# Patient Record
Sex: FEMALE | Race: BLACK OR AFRICAN AMERICAN | NOT HISPANIC OR LATINO | URBAN - METROPOLITAN AREA
[De-identification: names, ages, dates, MRNs, and addresses within clinical notes are randomized per-mention and may not be internally consistent; named-entity substitution may affect disease eponyms.]

---

## 2017-06-20 ENCOUNTER — OUTPATIENT (OUTPATIENT)
Dept: OUTPATIENT SERVICES | Facility: HOSPITAL | Age: 62
LOS: 1 days | End: 2017-06-20
Payer: COMMERCIAL

## 2017-06-20 DIAGNOSIS — Z98.51 TUBAL LIGATION STATUS: Chronic | ICD-10-CM

## 2017-06-21 PROCEDURE — A9516: CPT

## 2017-06-21 PROCEDURE — 78014 THYROID IMAGING W/BLOOD FLOW: CPT

## 2017-06-21 PROCEDURE — 78014 THYROID IMAGING W/BLOOD FLOW: CPT | Mod: 26

## 2018-04-04 ENCOUNTER — APPOINTMENT (OUTPATIENT)
Dept: ULTRASOUND IMAGING | Facility: CLINIC | Age: 63
End: 2018-04-04
Payer: COMMERCIAL

## 2018-04-04 ENCOUNTER — APPOINTMENT (OUTPATIENT)
Dept: MAMMOGRAPHY | Facility: CLINIC | Age: 63
End: 2018-04-04
Payer: COMMERCIAL

## 2018-04-04 ENCOUNTER — OUTPATIENT (OUTPATIENT)
Dept: OUTPATIENT SERVICES | Facility: HOSPITAL | Age: 63
LOS: 1 days | End: 2018-04-04

## 2018-04-04 DIAGNOSIS — Z98.51 TUBAL LIGATION STATUS: Chronic | ICD-10-CM

## 2018-04-04 PROCEDURE — G0279: CPT | Mod: 26

## 2018-04-04 PROCEDURE — 77066 DX MAMMO INCL CAD BI: CPT | Mod: 26

## 2018-04-04 PROCEDURE — 76641 ULTRASOUND BREAST COMPLETE: CPT | Mod: 26,50

## 2018-05-11 ENCOUNTER — APPOINTMENT (OUTPATIENT)
Dept: OBGYN | Facility: CLINIC | Age: 63
End: 2018-05-11
Payer: SELF-PAY

## 2018-05-11 VITALS
DIASTOLIC BLOOD PRESSURE: 78 MMHG | HEART RATE: 80 BPM | BODY MASS INDEX: 26.68 KG/M2 | HEIGHT: 62 IN | WEIGHT: 145 LBS | SYSTOLIC BLOOD PRESSURE: 122 MMHG

## 2018-05-11 PROCEDURE — 99386 PREV VISIT NEW AGE 40-64: CPT

## 2018-05-11 RX ORDER — ATENOLOL 25 MG/1
25 TABLET ORAL
Qty: 90 | Refills: 0 | Status: ACTIVE | COMMUNITY
Start: 2017-06-13

## 2018-05-11 RX ORDER — PEN NEEDLE, DIABETIC 29 G X1/2"
32G X 4 MM NEEDLE, DISPOSABLE MISCELLANEOUS
Qty: 360 | Refills: 0 | Status: ACTIVE | COMMUNITY
Start: 2018-03-28

## 2018-05-11 RX ORDER — OMEPRAZOLE 40 MG/1
40 CAPSULE, DELAYED RELEASE ORAL
Refills: 0 | Status: ACTIVE | COMMUNITY

## 2018-05-14 LAB — HPV HIGH+LOW RISK DNA PNL CVX: NOT DETECTED

## 2018-05-17 LAB — CYTOLOGY CVX/VAG DOC THIN PREP: NORMAL

## 2019-02-27 ENCOUNTER — TRANSCRIPTION ENCOUNTER (OUTPATIENT)
Age: 64
End: 2019-02-27

## 2019-02-27 ENCOUNTER — OUTPATIENT (OUTPATIENT)
Dept: EMERGENCY DEPT | Facility: HOSPITAL | Age: 64
LOS: 1 days | Discharge: HOME | End: 2019-02-27
Payer: MEDICAID

## 2019-02-27 VITALS
RESPIRATION RATE: 18 BRPM | DIASTOLIC BLOOD PRESSURE: 70 MMHG | TEMPERATURE: 98 F | SYSTOLIC BLOOD PRESSURE: 149 MMHG | OXYGEN SATURATION: 99 % | HEART RATE: 83 BPM

## 2019-02-27 VITALS
HEART RATE: 85 BPM | OXYGEN SATURATION: 100 % | TEMPERATURE: 98 F | DIASTOLIC BLOOD PRESSURE: 76 MMHG | RESPIRATION RATE: 16 BRPM | SYSTOLIC BLOOD PRESSURE: 127 MMHG

## 2019-02-27 DIAGNOSIS — Z98.51 TUBAL LIGATION STATUS: Chronic | ICD-10-CM

## 2019-02-27 DIAGNOSIS — Z98.890 OTHER SPECIFIED POSTPROCEDURAL STATES: Chronic | ICD-10-CM

## 2019-02-27 DIAGNOSIS — R07.9 CHEST PAIN, UNSPECIFIED: ICD-10-CM

## 2019-02-27 LAB
ALBUMIN SERPL ELPH-MCNC: 4.2 G/DL — SIGNIFICANT CHANGE UP (ref 3.3–5)
ALP SERPL-CCNC: 90 U/L — SIGNIFICANT CHANGE UP (ref 40–120)
ALT FLD-CCNC: 7 U/L — SIGNIFICANT CHANGE UP (ref 4–33)
ANION GAP SERPL CALC-SCNC: 16 MMO/L — HIGH (ref 7–14)
AST SERPL-CCNC: 10 U/L — SIGNIFICANT CHANGE UP (ref 4–32)
BASOPHILS # BLD AUTO: 0.05 K/UL — SIGNIFICANT CHANGE UP (ref 0–0.2)
BASOPHILS NFR BLD AUTO: 0.5 % — SIGNIFICANT CHANGE UP (ref 0–2)
BILIRUB SERPL-MCNC: 0.4 MG/DL — SIGNIFICANT CHANGE UP (ref 0.2–1.2)
BUN SERPL-MCNC: 25 MG/DL — HIGH (ref 7–23)
CALCIUM SERPL-MCNC: 9.9 MG/DL — SIGNIFICANT CHANGE UP (ref 8.4–10.5)
CHLORIDE SERPL-SCNC: 99 MMOL/L — SIGNIFICANT CHANGE UP (ref 98–107)
CO2 SERPL-SCNC: 23 MMOL/L — SIGNIFICANT CHANGE UP (ref 22–31)
CREAT SERPL-MCNC: 1.07 MG/DL — SIGNIFICANT CHANGE UP (ref 0.5–1.3)
EOSINOPHIL # BLD AUTO: 0.51 K/UL — HIGH (ref 0–0.5)
EOSINOPHIL NFR BLD AUTO: 5.1 % — SIGNIFICANT CHANGE UP (ref 0–6)
GLUCOSE SERPL-MCNC: 163 MG/DL — HIGH (ref 70–99)
HCT VFR BLD CALC: 33.4 % — LOW (ref 34.5–45)
HGB BLD-MCNC: 10 G/DL — LOW (ref 11.5–15.5)
IMM GRANULOCYTES NFR BLD AUTO: 0.3 % — SIGNIFICANT CHANGE UP (ref 0–1.5)
LYMPHOCYTES # BLD AUTO: 2.44 K/UL — SIGNIFICANT CHANGE UP (ref 1–3.3)
LYMPHOCYTES # BLD AUTO: 24.6 % — SIGNIFICANT CHANGE UP (ref 13–44)
MCHC RBC-ENTMCNC: 21.6 PG — LOW (ref 27–34)
MCHC RBC-ENTMCNC: 29.9 % — LOW (ref 32–36)
MCV RBC AUTO: 72.1 FL — LOW (ref 80–100)
MONOCYTES # BLD AUTO: 0.6 K/UL — SIGNIFICANT CHANGE UP (ref 0–0.9)
MONOCYTES NFR BLD AUTO: 6 % — SIGNIFICANT CHANGE UP (ref 2–14)
NEUTROPHILS # BLD AUTO: 6.3 K/UL — SIGNIFICANT CHANGE UP (ref 1.8–7.4)
NEUTROPHILS NFR BLD AUTO: 63.5 % — SIGNIFICANT CHANGE UP (ref 43–77)
NRBC # FLD: 0 K/UL — LOW (ref 25–125)
PLATELET # BLD AUTO: 463 K/UL — HIGH (ref 150–400)
PMV BLD: 10.8 FL — SIGNIFICANT CHANGE UP (ref 7–13)
POTASSIUM SERPL-MCNC: 4.2 MMOL/L — SIGNIFICANT CHANGE UP (ref 3.5–5.3)
POTASSIUM SERPL-SCNC: 4.2 MMOL/L — SIGNIFICANT CHANGE UP (ref 3.5–5.3)
PROT SERPL-MCNC: 7.3 G/DL — SIGNIFICANT CHANGE UP (ref 6–8.3)
RBC # BLD: 4.63 M/UL — SIGNIFICANT CHANGE UP (ref 3.8–5.2)
RBC # FLD: 17.7 % — HIGH (ref 10.3–14.5)
SODIUM SERPL-SCNC: 138 MMOL/L — SIGNIFICANT CHANGE UP (ref 135–145)
TROPONIN T, HIGH SENSITIVITY: < 6 NG/L — SIGNIFICANT CHANGE UP (ref ?–14)
TROPONIN T, HIGH SENSITIVITY: < 6 NG/L — SIGNIFICANT CHANGE UP (ref ?–14)
WBC # BLD: 9.93 K/UL — SIGNIFICANT CHANGE UP (ref 3.8–10.5)
WBC # FLD AUTO: 9.93 K/UL — SIGNIFICANT CHANGE UP (ref 3.8–10.5)

## 2019-02-27 PROCEDURE — 78452 HT MUSCLE IMAGE SPECT MULT: CPT | Mod: 26

## 2019-02-27 PROCEDURE — 93018 CV STRESS TEST I&R ONLY: CPT | Mod: GC

## 2019-02-27 PROCEDURE — 71046 X-RAY EXAM CHEST 2 VIEWS: CPT | Mod: 26

## 2019-02-27 PROCEDURE — 93016 CV STRESS TEST SUPVJ ONLY: CPT | Mod: GC

## 2019-02-27 RX ORDER — GLUCAGON INJECTION, SOLUTION 0.5 MG/.1ML
1 INJECTION, SOLUTION SUBCUTANEOUS ONCE
Qty: 0 | Refills: 0 | Status: DISCONTINUED | OUTPATIENT
Start: 2019-02-27 | End: 2019-02-27

## 2019-02-27 RX ORDER — DEXTROSE 50 % IN WATER 50 %
12.5 SYRINGE (ML) INTRAVENOUS ONCE
Qty: 0 | Refills: 0 | Status: DISCONTINUED | OUTPATIENT
Start: 2019-02-27 | End: 2019-02-27

## 2019-02-27 RX ORDER — INSULIN GLARGINE 100 [IU]/ML
16 INJECTION, SOLUTION SUBCUTANEOUS AT BEDTIME
Qty: 0 | Refills: 0 | Status: DISCONTINUED | OUTPATIENT
Start: 2019-02-27 | End: 2019-02-27

## 2019-02-27 RX ORDER — TRIAMTERENE/HYDROCHLOROTHIAZID 75 MG-50MG
1 TABLET ORAL DAILY
Qty: 0 | Refills: 0 | Status: DISCONTINUED | OUTPATIENT
Start: 2019-02-27 | End: 2019-02-27

## 2019-02-27 RX ORDER — ASPIRIN/CALCIUM CARB/MAGNESIUM 324 MG
81 TABLET ORAL DAILY
Qty: 0 | Refills: 0 | Status: DISCONTINUED | OUTPATIENT
Start: 2019-02-27 | End: 2019-02-27

## 2019-02-27 RX ORDER — INSULIN LISPRO 100/ML
VIAL (ML) SUBCUTANEOUS AT BEDTIME
Qty: 0 | Refills: 0 | Status: DISCONTINUED | OUTPATIENT
Start: 2019-02-27 | End: 2019-02-27

## 2019-02-27 RX ORDER — METFORMIN HYDROCHLORIDE 850 MG/1
500 TABLET ORAL DAILY
Qty: 0 | Refills: 0 | Status: DISCONTINUED | OUTPATIENT
Start: 2019-02-27 | End: 2019-02-27

## 2019-02-27 RX ORDER — SODIUM CHLORIDE 9 MG/ML
1000 INJECTION, SOLUTION INTRAVENOUS
Qty: 0 | Refills: 0 | Status: DISCONTINUED | OUTPATIENT
Start: 2019-02-27 | End: 2019-02-27

## 2019-02-27 RX ORDER — DEXTROSE 50 % IN WATER 50 %
25 SYRINGE (ML) INTRAVENOUS ONCE
Qty: 0 | Refills: 0 | Status: DISCONTINUED | OUTPATIENT
Start: 2019-02-27 | End: 2019-02-27

## 2019-02-27 RX ORDER — ATORVASTATIN CALCIUM 80 MG/1
20 TABLET, FILM COATED ORAL AT BEDTIME
Qty: 0 | Refills: 0 | Status: DISCONTINUED | OUTPATIENT
Start: 2019-02-27 | End: 2019-02-27

## 2019-02-27 RX ORDER — ASPIRIN/CALCIUM CARB/MAGNESIUM 324 MG
162 TABLET ORAL ONCE
Qty: 0 | Refills: 0 | Status: COMPLETED | OUTPATIENT
Start: 2019-02-27 | End: 2019-02-27

## 2019-02-27 RX ORDER — DEXTROSE 50 % IN WATER 50 %
15 SYRINGE (ML) INTRAVENOUS ONCE
Qty: 0 | Refills: 0 | Status: DISCONTINUED | OUTPATIENT
Start: 2019-02-27 | End: 2019-02-27

## 2019-02-27 RX ORDER — LOSARTAN POTASSIUM 100 MG/1
50 TABLET, FILM COATED ORAL DAILY
Qty: 0 | Refills: 0 | Status: DISCONTINUED | OUTPATIENT
Start: 2019-02-27 | End: 2019-02-27

## 2019-02-27 RX ORDER — INSULIN LISPRO 100/ML
VIAL (ML) SUBCUTANEOUS
Qty: 0 | Refills: 0 | Status: DISCONTINUED | OUTPATIENT
Start: 2019-02-27 | End: 2019-02-27

## 2019-02-27 RX ADMIN — Medication 162 MILLIGRAM(S): at 06:17

## 2019-02-27 RX ADMIN — Medication 1: at 13:08

## 2019-02-27 RX ADMIN — Medication 1 TABLET(S): at 13:09

## 2019-02-27 RX ADMIN — LOSARTAN POTASSIUM 50 MILLIGRAM(S): 100 TABLET, FILM COATED ORAL at 13:09

## 2019-02-27 NOTE — DISCHARGE NOTE ADULT - HOSPITAL COURSE
63 y.o. female presents today to ER c/o chest pain.  Negative Troponin.   Stress test  Myocardial Perfusion SPECT results are abnormal.  * There are medium sized, mild to moderate defects in  inferior and inferoseptal walls that are partially  reversible, suggestive of infarction with moderate  radha-infarct ischemia.  * Post-stress gated wall motion analysis was performed  (LVEF = 56 %;LVEDV = 56 ml.), revealing normal LV  function.  *** No previous Nuclear/Stress exam.    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   Stable 8 mm left upper lung nodule.  Otherwise, clear lungs with no significant change since 12/23/2013.    HgbA1c 9.2, recommended to follow up with endocrinologist in 1 week     As per Dr. Pinto, the patient can be discharged home today, instructed to follow up with her cardiologist, endocrinologist in 1 week

## 2019-02-27 NOTE — ED CDU PROVIDER INITIAL DAY NOTE - FAMILY HISTORY
No pertinent family history in first degree relatives Mother  Still living? No  Family history of MI (myocardial infarction), Age at diagnosis: Age Unknown

## 2019-02-27 NOTE — ED CDU PROVIDER DISPOSITION NOTE - NSFOLLOWUPINSTRUCTIONS_ED_ALL_ED_FT
Follow up with your PMD within 48-72 hours. Recommend follow up with a Cardiologist- referral list provided within the week. Show copies of your reports given to you. Recommend Aspirin 81mg over the counter daily until further evaluation.  Take all of your other medications as previously prescribed. Worsening, continued or new concerning symptoms return to the Emergency Department.

## 2019-02-27 NOTE — H&P CARDIOLOGY - HISTORY OF PRESENT ILLNESS
63 y.o. female presents today to ER c/o L sided chest started last night, 5/10, didn't radiate, continued for 1 hr. Denies diaphoresis, SOB, paresthesia, b/l lower edema. 63 y.o. female presents today to ER c/o L sided chest started last night, 5/10, didn't radiate, continued for 1 hr. Denies diaphoresis, SOB, paresthesia, SOB, palpitations, dizziness, presyncope, syncope, b/l lower extremities edema, abdominal pain, N/V/D/C, melena, hematochezia, h/o DVT, PE, DOMENIC, fever, chills, urinary symptoms, hematuria, recent travel, sick contacts. The patient was found to have negative Troponin. + stress test. The patient was brought to cath/recovery for cardiac cath.     Cardiac cath - mild non obstructive CAD, recommended to continue medications.

## 2019-02-27 NOTE — ED CDU PROVIDER INITIAL DAY NOTE - OBJECTIVE STATEMENT
63 year old female with a PMHx of HTN, DM pw constant, nonexertional left sided chest pain since last night. Pain was initially sharp in nature now dull, no associated symptoms. Last stress test ~ 1 year ago which was WNL. Denies n/v/f/c, cough, SOB, abdominal pain, dysuria, hematuria, melena, hematochezia, diarrhea, constipation, LE swelling/pain, immobilization, hx of clots/pe/dvt, palpitations.  In the ED: pt had TWI in leads v2-v3, sent to the CDU to r/o ACS: tele monitoring, trend troponin, stress test. 63 year old female with a PMHx of HTN, DM pw constant, nonexertional left sided chest pain since last night. Pain was initially sharp in nature now dull, no associated symptoms. Last stress test ~ 1 year ago which was WNL - unsure of her cardiologist. +family hx of mother with MI. Denies n/v/f/c, cough, SOB, abdominal pain, dysuria, hematuria, melena, hematochezia, diarrhea, constipation, LE swelling/pain, immobilization, hx of clots/pe/dvt, palpitations.  In the ED: pt had TWI in leads v2-v3, sent to the CDU to r/o ACS: tele monitoring, trend troponin, stress test. 63 year old female with a PMHx of HTN, DM pw constant, nonexertional left sided chest pain since last night after getting in an argument with her daughter. Pain was initially sharp in nature now dull, no associated symptoms. Last stress test ~ 1 year ago which was WNL - unsure of her cardiologist. +family hx of mother with MI. Denies n/v/f/c, cough, SOB, abdominal pain, dysuria, hematuria, melena, hematochezia, diarrhea, constipation, LE swelling/pain, immobilization, hx of clots/pe/dvt, palpitations.  In the ED: pt had TWI in leads v2-v3, sent to the CDU to r/o ACS: tele monitoring, trend troponin, stress test.

## 2019-02-27 NOTE — ED CDU PROVIDER INITIAL DAY NOTE - MEDICAL DECISION MAKING DETAILS
63 year old female with a PMHx of HTN, DM pw constant, nonexertional left sided chest pain since last night.   Plan: stress test, trend trop/ekg, tele monitor

## 2019-02-27 NOTE — DISCHARGE NOTE ADULT - PATIENT PORTAL LINK FT
You can access the [facility], offered by [facility], by registering with the following website: [email or web address]

## 2019-02-27 NOTE — ED CDU PROVIDER DISPOSITION NOTE - ATTENDING CONTRIBUTION TO CARE
I performed a face-to-face evaluation of the patient and performed a history and physical examination. I agree with the history and physical examination.    Sarthak: Middle-age F, describes CP precipitated by argument w/ daughter. Active w/ grandsons; doesn't get CP. Stress test positive for inf. and inf.-septal defects. Admit for cath.

## 2019-02-27 NOTE — DISCHARGE NOTE ADULT - SECONDARY DIAGNOSIS.
PUD (peptic ulcer disease) Hyperlipidemia, unspecified hyperlipidemia type Type 2 diabetes mellitus without complication, with long-term current use of insulin Hypertension, unspecified type Hyperthyroidism

## 2019-02-27 NOTE — H&P CARDIOLOGY - PMH
Hyperlipidemia    Hypertension    Hyperthyroidism    Other iron deficiency anemia    Personal history of diabetes mellitus    PUD (peptic ulcer disease)    Tobacco user

## 2019-02-27 NOTE — DISCHARGE NOTE ADULT - PLAN OF CARE
to maintain normal thyroid levels Please follow up with your endocrinologist regarding enlarged thyroid. Continue present medications. Avoid acidic or spicy food. Continue Omeprazole. Follow up with your gastroenterologist in 1-2 weeks. To maintain normal cholesterol levels to prevent heart attack, stroke Continue Rosuvastatin. Follow up with your PMD to routine blood check. Follow low cholesterol diet. To maintain normal blood glucose levels hole Metformin for 3 days, restart on 3/2/19, You HGbA1c is 9.0. Please follow up with your endocrinologist to review your medications. Follow low carbohydrate and low fat diet. to avoid symptoms Please follow up with your cardiologist in 1 week To maintain normal blood pressure and avoid complications such as heart attack, stroke, renal failure COntinue your blood pressure medication. Follow low sodium and low cholesterol diet. Follow up with your PMD/cardiologist in 1 week.

## 2019-02-27 NOTE — ED CDU PROVIDER DISPOSITION NOTE - CLINICAL COURSE
Sarthak: Middle-age F, describes CP precipitated by argument w/ daughter. Active w/ grandsons; doesn't get CP. Stress test positive for inf. and inf.-septal defects. Admit for cath.

## 2019-02-27 NOTE — ED ADULT NURSE NOTE - OBJECTIVE STATEMENT
64 yo AAO4, ambulatory, brought to intake room 5 c/o CP. pt describes it as midsternal, "a little achy,' nonradiating. NSR on monitor. 22g placed in left hand, labs drawn and sent, will await further orders

## 2019-02-27 NOTE — CHART NOTE - NSCHARTNOTEFT_GEN_A_CORE
The patient was noted to have elevated HgbA1c 9.2, recommended endocrinology consult, however she declined and claims that will f/u with her endocrinologist in 3/2019. The patient claims that her HgbA1c was 10 in 12/2018 and she is compliant with her medications.

## 2019-02-27 NOTE — ED CDU PROVIDER INITIAL DAY NOTE - ATTENDING CONTRIBUTION TO CARE
I performed a face-to-face evaluation of the patient and performed a history and physical examination. I agree with the history and physical examination.    Sarthak: Middle-age F, describes CP precipitated by argument w/ daughter. Active w/ grandsons; doesn't get CP. Initial trop. and EKG unremarkable. Admit to CDU for stress test.

## 2019-02-27 NOTE — ED ADULT TRIAGE NOTE - CHIEF COMPLAINT QUOTE
Patient from home c/o L sided chest pain s/p argument with daughter. Patient denies N/V, sob, palpitations. Hx. Acid reflux, DM, hypothyroidism, HTN.

## 2019-02-27 NOTE — ED PROVIDER NOTE - OBJECTIVE STATEMENT
62 yo F with HTN, DM here with left sided chest pain that is pressure-like and began after the pt had a verbal argument with her daughter. The pt denies f/c, cough, sob, palpitations, diaphoresis, n/v, abdominal pain, paresthesias, weakness. The pt had a negative stress test one year ago.

## 2019-02-27 NOTE — ED CDU PROVIDER INITIAL DAY NOTE - PROGRESS NOTE DETAILS
received sign out from MADISON Hodges. pt seen and examined by Dr. De León and I. pt states she was arguing with her daughter last night and developed chest tightness. pt stressed over their argument, crying right now. admits to feeling sad, no SI. states feeling better today with no cp, sob, le edema, diaphoresis, n/v. exam: well appearing. mucosa moist. heart rrr. lungs clear b/l. no le edema, no calf tenderness. I called her outpt cardiologist Dr. Coyne at [phone]. can follow up today or tomorrow. spoke with kelsea from stress lab, given cancelations can take pt for stress today now. will continue to monitor. received call from stress lab cardiologist, stress test with mild ischemia to interior to septal wall. called dr. ely cardiologist outpt who is unavailable. cards paged to see pt pt seen by cards, accepted to Dr. Pinto for cath received call from stress lab cardiologist, stress test with mild ischemia to interior to septal wall. called dr. ely cardiologist outpt, aware of plan. not affiliated here. cards paged to see pt

## 2019-02-27 NOTE — DISCHARGE NOTE ADULT - MEDICATION SUMMARY - MEDICATIONS TO TAKE
I will START or STAY ON the medications listed below when I get home from the hospital:    Aspirin Enteric Coated 81 mg oral delayed release tablet  -- 1 tab(s) by mouth once a day  -- Indication: For Prophylaxis    losartan 50 mg oral tablet  -- 1 tab(s) by mouth once a day  -- Indication: For HTN    calcium carbonate  -- Indication: For Prophylaxis    Lantus 100 units/mL subcutaneous solution  -- 20 unit(s) subcutaneous once a day (at bedtime)  -- Indication: For DM2    Januvia 100 mg oral tablet  -- 1 tab(s) by mouth once a day  -- Indication: For DM 2    metFORMIN 1000 mg oral tablet  -- 1 tab(s) by mouth 2 times a day  HOLD METFORMIN FRO 3 DAYS, RESTART ON 3/2/19  -- Indication: For DM 2    metFORMIN 500 mg oral tablet, extended release  -- 1 tab(s) by mouth once a day  HOLD METFROMIN FOR 3 DAYS, RESTART ON 3/2/19  -- Indication: For DM 2    rosuvastatin 5 mg oral tablet  -- 1 tab(s) by mouth once a day (at bedtime)  -- Indication: For Hypercholesterolemia    triamterene-hydrochlorothiazide 37.5 mg-25 mg oral tablet  -- 1 tab(s) by mouth once a day  -- Indication: For HTN    methIMAzole 5 mg oral tablet  -- 1 tab(s) by mouth once a day  -- Indication: For Hyperthyroidism    atenolol 25 mg oral tablet  -- 1 tab(s) by mouth once a day  -- Indication: For HTN    Turmeric 500 mg oral capsule  -- 1 cap(s) by mouth once a day  -- Indication: For supplement    ferrous sulfate 325 mg (65 mg elemental iron) oral tablet  -- 2 tab(s) by mouth once a day  -- Indication: For Anemia    brimonidine 0.1% ophthalmic solution  -- 1 drop(s) to R eye 2 times a day  -- Indication: For Glaucoma    timolol hemihydrate 0.5% ophthalmic solution  -- 1 drop(s) to R eye 2 times a day  -- Indication: For Glaucoma    latanoprost 0.005% ophthalmic solution  -- 1 drop(s) to R eye once a day (in the evening)  -- Indication: For Glaucoma    omeprazole 40 mg oral delayed release capsule  -- 1 cap(s) by mouth once a day  -- Indication: For PUD (peptic ulcer disease)    Vitamin B12  -- Indication: For VIt B12 deficiency

## 2019-02-27 NOTE — DISCHARGE NOTE ADULT - CARE PROVIDER_API CALL
Uri Barber)  Internal Medicine  [address] [place] [address]  Phone: [phone]  Fax: [phone]  Follow Up Time:

## 2019-02-27 NOTE — DISCHARGE NOTE ADULT - CARE PLAN
Principal Discharge DX:	Atypical chest pain  Goal:	to avoid symptoms  Assessment and plan of treatment:	Please follow up with your cardiologist in 1 week  Secondary Diagnosis:	Hypertension, unspecified type  Goal:	To maintain normal blood pressure and avoid complications such as heart attack, stroke, renal failure  Assessment and plan of treatment:	COntinue your blood pressure medication. Follow low sodium and low cholesterol diet. Follow up with your PMD/cardiologist in 1 week.  Secondary Diagnosis:	Hyperthyroidism  Goal:	to maintain normal thyroid levels  Assessment and plan of treatment:	Please follow up with your endocrinologist regarding enlarged thyroid. Continue present medications.  Secondary Diagnosis:	PUD (peptic ulcer disease)  Goal:	Avoid acidic or spicy food. Continue Omeprazole. Follow up with your gastroenterologist in 1-2 weeks.  Secondary Diagnosis:	Hyperlipidemia, unspecified hyperlipidemia type  Goal:	To maintain normal cholesterol levels to prevent heart attack, stroke  Assessment and plan of treatment:	Continue Rosuvastatin. Follow up with your PMD to routine blood check. Follow low cholesterol diet.  Secondary Diagnosis:	Type 2 diabetes mellitus without complication, with long-term current use of insulin  Goal:	To maintain normal blood glucose levels  Assessment and plan of treatment:	hole Metformin for 3 days, restart on 3/2/19, You HGbA1c is 9.0. Please follow up with your endocrinologist to review your medications. Follow low carbohydrate and low fat diet.

## 2019-03-01 ENCOUNTER — OUTPATIENT (OUTPATIENT)
Dept: OUTPATIENT SERVICES | Facility: HOSPITAL | Age: 64
LOS: 1 days | End: 2019-03-01
Payer: MEDICARE

## 2019-03-01 DIAGNOSIS — Z98.890 OTHER SPECIFIED POSTPROCEDURAL STATES: Chronic | ICD-10-CM

## 2019-03-01 DIAGNOSIS — Z98.51 TUBAL LIGATION STATUS: Chronic | ICD-10-CM

## 2019-03-01 PROCEDURE — G9001: CPT

## 2019-03-04 RX ORDER — METHIMAZOLE 10 MG/1
1 TABLET ORAL
Qty: 0 | Refills: 0 | COMMUNITY

## 2019-03-04 RX ORDER — CALCIUM CARBONATE 500(1250)
0 TABLET ORAL
Qty: 0 | Refills: 0 | COMMUNITY

## 2019-03-04 RX ORDER — LATANOPROST 0.05 MG/ML
1 SOLUTION/ DROPS OPHTHALMIC; TOPICAL
Qty: 0 | Refills: 0 | COMMUNITY

## 2019-03-04 RX ORDER — TRIAMTERENE/HYDROCHLOROTHIAZID 75 MG-50MG
1 TABLET ORAL
Qty: 0 | Refills: 0 | COMMUNITY

## 2019-03-04 RX ORDER — TIMOLOL 0.5 %
1 DROPS OPHTHALMIC (EYE)
Qty: 0 | Refills: 0 | COMMUNITY

## 2019-03-04 RX ORDER — MILK THISTLE 150 MG
1 CAPSULE ORAL
Qty: 0 | Refills: 0 | COMMUNITY

## 2019-03-04 RX ORDER — METFORMIN HYDROCHLORIDE 850 MG/1
1 TABLET ORAL
Qty: 0 | Refills: 0 | COMMUNITY

## 2019-03-04 RX ORDER — ASPIRIN/CALCIUM CARB/MAGNESIUM 324 MG
1 TABLET ORAL
Qty: 0 | Refills: 0 | COMMUNITY

## 2019-03-04 RX ORDER — ATENOLOL 25 MG/1
1 TABLET ORAL
Qty: 0 | Refills: 0 | COMMUNITY

## 2019-03-04 RX ORDER — ROSUVASTATIN CALCIUM 5 MG/1
1 TABLET ORAL
Qty: 0 | Refills: 0 | COMMUNITY

## 2019-03-04 RX ORDER — PREGABALIN 225 MG/1
0 CAPSULE ORAL
Qty: 0 | Refills: 0 | COMMUNITY

## 2019-03-04 RX ORDER — FERROUS SULFATE 325(65) MG
2 TABLET ORAL
Qty: 0 | Refills: 0 | COMMUNITY

## 2019-03-04 RX ORDER — OMEPRAZOLE 10 MG/1
1 CAPSULE, DELAYED RELEASE ORAL
Qty: 0 | Refills: 0 | COMMUNITY

## 2019-03-04 RX ORDER — LOSARTAN POTASSIUM 100 MG/1
1 TABLET, FILM COATED ORAL
Qty: 0 | Refills: 0 | COMMUNITY

## 2019-03-04 RX ORDER — BRIMONIDINE TARTRATE 2 MG/MG
1 SOLUTION/ DROPS OPHTHALMIC
Qty: 0 | Refills: 0 | COMMUNITY

## 2019-03-04 RX ORDER — SITAGLIPTIN 50 MG/1
1 TABLET, FILM COATED ORAL
Qty: 0 | Refills: 0 | COMMUNITY

## 2019-03-04 RX ORDER — INSULIN GLARGINE 100 [IU]/ML
20 INJECTION, SOLUTION SUBCUTANEOUS
Qty: 0 | Refills: 0 | COMMUNITY

## 2019-03-08 DIAGNOSIS — Z71.89 OTHER SPECIFIED COUNSELING: ICD-10-CM

## 2019-03-08 PROBLEM — K27.9 PEPTIC ULCER, SITE UNSPECIFIED, UNSPECIFIED AS ACUTE OR CHRONIC, WITHOUT HEMORRHAGE OR PERFORATION: Chronic | Status: ACTIVE | Noted: 2019-02-27

## 2019-03-08 PROBLEM — D50.8 OTHER IRON DEFICIENCY ANEMIAS: Chronic | Status: ACTIVE | Noted: 2019-02-27

## 2019-03-08 PROBLEM — E05.90 THYROTOXICOSIS, UNSPECIFIED WITHOUT THYROTOXIC CRISIS OR STORM: Chronic | Status: ACTIVE | Noted: 2019-02-27

## 2019-04-09 ENCOUNTER — APPOINTMENT (OUTPATIENT)
Dept: OBGYN | Facility: CLINIC | Age: 64
End: 2019-04-09
Payer: MEDICARE

## 2019-04-09 VITALS
HEART RATE: 88 BPM | HEIGHT: 62 IN | WEIGHT: 144.19 LBS | SYSTOLIC BLOOD PRESSURE: 150 MMHG | BODY MASS INDEX: 26.53 KG/M2 | DIASTOLIC BLOOD PRESSURE: 85 MMHG

## 2019-04-09 DIAGNOSIS — R45.7 STATE OF EMOTIONAL SHOCK AND STRESS, UNSPECIFIED: ICD-10-CM

## 2019-04-09 PROCEDURE — 99213 OFFICE O/P EST LOW 20 MIN: CPT

## 2019-04-09 NOTE — CHIEF COMPLAINT
[Follow Up] : follow up GYN visit [FreeTextEntry1] : pt presents for follow up , s/p visit to ED pt has Type 2 Diabetes followed by Dr. Feliciano . while hospitalized on 2/27 pt had cardiac cath which showed non - obstructive CAD. pt states that something was mentioned to her during the ED visit about 'cervical cancer.......follow up with your gynecologist....' pt feels well. but is having family discord /emotional stress with daughter Lucila Husbands

## 2019-05-18 ENCOUNTER — EMERGENCY (EMERGENCY)
Facility: HOSPITAL | Age: 64
LOS: 1 days | Discharge: HOME | End: 2019-05-18
Admitting: EMERGENCY MEDICINE
Payer: MEDICARE

## 2019-05-18 VITALS
DIASTOLIC BLOOD PRESSURE: 79 MMHG | RESPIRATION RATE: 16 BRPM | TEMPERATURE: 98 F | OXYGEN SATURATION: 100 % | HEART RATE: 81 BPM | SYSTOLIC BLOOD PRESSURE: 154 MMHG

## 2019-05-18 DIAGNOSIS — Z98.890 OTHER SPECIFIED POSTPROCEDURAL STATES: Chronic | ICD-10-CM

## 2019-05-18 DIAGNOSIS — Z98.51 TUBAL LIGATION STATUS: Chronic | ICD-10-CM

## 2019-05-18 PROCEDURE — 99283 EMERGENCY DEPT VISIT LOW MDM: CPT

## 2019-05-18 NOTE — ED ADULT TRIAGE NOTE - CHIEF COMPLAINT QUOTE
Pt [place] [employer] with [employer] for safety, reports Suicidal thoughts after having Verbal Argument with her daughter. report she says that to make her daughter fell upst. Dneies Psych Hx. Denies HI , Hearing /visual Hallucination

## 2019-05-18 NOTE — ED PROVIDER NOTE - CLINICAL SUMMARY MEDICAL DECISION MAKING FREE TEXT BOX
65 y/o  M hx  HTN, HLD, DM, Hypothyroid   Medical evaluation performed. There is no clinical evidence of intoxication or any acute medical problem requiring immediate intervention.  Recommend following up with [facility]. D/C home via cab

## 2019-05-18 NOTE — ED PROVIDER NOTE - NSFOLLOWUPCLINICS_GEN_ALL_ED_FT
[facility]  Behavioral Health  [address] [place] [address]  Phone: [phone]  Fax:   Follow Up Time:

## 2019-05-20 ENCOUNTER — EMERGENCY (EMERGENCY)
Facility: HOSPITAL | Age: 64
LOS: 1 days | Discharge: HOME | End: 2019-05-20
Attending: EMERGENCY MEDICINE | Admitting: EMERGENCY MEDICINE
Payer: MEDICARE

## 2019-05-20 VITALS
SYSTOLIC BLOOD PRESSURE: 140 MMHG | OXYGEN SATURATION: 98 % | HEART RATE: 85 BPM | DIASTOLIC BLOOD PRESSURE: 62 MMHG | RESPIRATION RATE: 18 BRPM | TEMPERATURE: 98 F

## 2019-05-20 DIAGNOSIS — R69 ILLNESS, UNSPECIFIED: ICD-10-CM

## 2019-05-20 DIAGNOSIS — F43.21 ADJUSTMENT DISORDER WITH DEPRESSED MOOD: ICD-10-CM

## 2019-05-20 DIAGNOSIS — Z98.51 TUBAL LIGATION STATUS: Chronic | ICD-10-CM

## 2019-05-20 DIAGNOSIS — Z98.890 OTHER SPECIFIED POSTPROCEDURAL STATES: Chronic | ICD-10-CM

## 2019-05-20 LAB
ALBUMIN SERPL ELPH-MCNC: 5.1 G/DL — HIGH (ref 3.3–5)
ALP SERPL-CCNC: 105 U/L — SIGNIFICANT CHANGE UP (ref 40–120)
ALT FLD-CCNC: 14 U/L — SIGNIFICANT CHANGE UP (ref 4–33)
ANION GAP SERPL CALC-SCNC: 17 MMO/L — HIGH (ref 7–14)
ANISOCYTOSIS BLD QL: SIGNIFICANT CHANGE UP
AST SERPL-CCNC: 17 U/L — SIGNIFICANT CHANGE UP (ref 4–32)
BASOPHILS # BLD AUTO: 0.06 K/UL — SIGNIFICANT CHANGE UP (ref 0–0.2)
BASOPHILS NFR BLD AUTO: 0.7 % — SIGNIFICANT CHANGE UP (ref 0–2)
BASOPHILS NFR SPEC: 0.9 % — SIGNIFICANT CHANGE UP (ref 0–2)
BILIRUB SERPL-MCNC: 0.7 MG/DL — SIGNIFICANT CHANGE UP (ref 0.2–1.2)
BLASTS # FLD: 0 % — SIGNIFICANT CHANGE UP (ref 0–0)
BUN SERPL-MCNC: 24 MG/DL — HIGH (ref 7–23)
CALCIUM SERPL-MCNC: 10.5 MG/DL — SIGNIFICANT CHANGE UP (ref 8.4–10.5)
CHLORIDE SERPL-SCNC: 96 MMOL/L — LOW (ref 98–107)
CO2 SERPL-SCNC: 25 MMOL/L — SIGNIFICANT CHANGE UP (ref 22–31)
CREAT SERPL-MCNC: 1.2 MG/DL — SIGNIFICANT CHANGE UP (ref 0.5–1.3)
EOSINOPHIL # BLD AUTO: 0.14 K/UL — SIGNIFICANT CHANGE UP (ref 0–0.5)
EOSINOPHIL NFR BLD AUTO: 1.6 % — SIGNIFICANT CHANGE UP (ref 0–6)
EOSINOPHIL NFR FLD: 4.3 % — SIGNIFICANT CHANGE UP (ref 0–6)
GIANT PLATELETS BLD QL SMEAR: PRESENT — SIGNIFICANT CHANGE UP
GLUCOSE SERPL-MCNC: 217 MG/DL — HIGH (ref 70–99)
HCT VFR BLD CALC: 33.4 % — LOW (ref 34.5–45)
HGB BLD-MCNC: 10.5 G/DL — LOW (ref 11.5–15.5)
HYPOCHROMIA BLD QL: SIGNIFICANT CHANGE UP
IMM GRANULOCYTES NFR BLD AUTO: 0.3 % — SIGNIFICANT CHANGE UP (ref 0–1.5)
LYMPHOCYTES # BLD AUTO: 2.82 K/UL — SIGNIFICANT CHANGE UP (ref 1–3.3)
LYMPHOCYTES # BLD AUTO: 31.4 % — SIGNIFICANT CHANGE UP (ref 13–44)
LYMPHOCYTES NFR SPEC AUTO: 32.2 % — SIGNIFICANT CHANGE UP (ref 13–44)
MCHC RBC-ENTMCNC: 21.7 PG — LOW (ref 27–34)
MCHC RBC-ENTMCNC: 31.4 % — LOW (ref 32–36)
MCV RBC AUTO: 69.2 FL — LOW (ref 80–100)
METAMYELOCYTES # FLD: 0 % — SIGNIFICANT CHANGE UP (ref 0–1)
MICROCYTES BLD QL: SIGNIFICANT CHANGE UP
MONOCYTES # BLD AUTO: 0.79 K/UL — SIGNIFICANT CHANGE UP (ref 0–0.9)
MONOCYTES NFR BLD AUTO: 8.8 % — SIGNIFICANT CHANGE UP (ref 2–14)
MONOCYTES NFR BLD: 7.8 % — SIGNIFICANT CHANGE UP (ref 2–9)
MYELOCYTES NFR BLD: 0 % — SIGNIFICANT CHANGE UP (ref 0–0)
NEUTROPHIL AB SER-ACNC: 53.9 % — SIGNIFICANT CHANGE UP (ref 43–77)
NEUTROPHILS # BLD AUTO: 5.14 K/UL — SIGNIFICANT CHANGE UP (ref 1.8–7.4)
NEUTROPHILS NFR BLD AUTO: 57.2 % — SIGNIFICANT CHANGE UP (ref 43–77)
NEUTS BAND # BLD: 0 % — SIGNIFICANT CHANGE UP (ref 0–6)
NRBC # FLD: 0 K/UL — SIGNIFICANT CHANGE UP (ref 0–0)
OTHER - HEMATOLOGY %: 0 — SIGNIFICANT CHANGE UP
PLATELET # BLD AUTO: 423 K/UL — HIGH (ref 150–400)
PLATELET COUNT - ESTIMATE: NORMAL — SIGNIFICANT CHANGE UP
PMV BLD: 10.2 FL — SIGNIFICANT CHANGE UP (ref 7–13)
POIKILOCYTOSIS BLD QL AUTO: SIGNIFICANT CHANGE UP
POLYCHROMASIA BLD QL SMEAR: SIGNIFICANT CHANGE UP
POTASSIUM SERPL-MCNC: 4.4 MMOL/L — SIGNIFICANT CHANGE UP (ref 3.5–5.3)
POTASSIUM SERPL-SCNC: 4.4 MMOL/L — SIGNIFICANT CHANGE UP (ref 3.5–5.3)
PROMYELOCYTES # FLD: 0 % — SIGNIFICANT CHANGE UP (ref 0–0)
PROT SERPL-MCNC: 8.2 G/DL — SIGNIFICANT CHANGE UP (ref 6–8.3)
RBC # BLD: 4.83 M/UL — SIGNIFICANT CHANGE UP (ref 3.8–5.2)
RBC # FLD: 17.6 % — HIGH (ref 10.3–14.5)
SCHISTOCYTES BLD QL AUTO: SLIGHT — SIGNIFICANT CHANGE UP
SODIUM SERPL-SCNC: 138 MMOL/L — SIGNIFICANT CHANGE UP (ref 135–145)
TSH SERPL-MCNC: 1.54 UIU/ML — SIGNIFICANT CHANGE UP (ref 0.27–4.2)
VARIANT LYMPHS # BLD: 0.9 % — SIGNIFICANT CHANGE UP
WBC # BLD: 8.98 K/UL — SIGNIFICANT CHANGE UP (ref 3.8–10.5)
WBC # FLD AUTO: 8.98 K/UL — SIGNIFICANT CHANGE UP (ref 3.8–10.5)

## 2019-05-20 PROCEDURE — 90792 PSYCH DIAG EVAL W/MED SRVCS: CPT | Mod: GC

## 2019-05-20 PROCEDURE — 99283 EMERGENCY DEPT VISIT LOW MDM: CPT

## 2019-05-20 NOTE — ED BEHAVIORAL HEALTH ASSESSMENT NOTE - CASE SUMMARY
64F with no formal psych history presents after making a suicidal threat in the context of argument with daughter. Patient does not meet full criteria for a depressive episode. Admits to making suicidal statement when upset over daughter leaving, with intent of making daughter return and see how upset pt was. patient denies any intent to actually hurt herself, never had a suicide plan, no homicidal ideation or violent behavior. Patient is not manic or psychotic, is motivated for treatment and appropriately safety plans in the ED. no indication for psychiatric admission. will provide urgent referral, SW to follow up. DC home. Daughter aware of plan

## 2019-05-20 NOTE — ED BEHAVIORAL HEALTH ASSESSMENT NOTE - DESCRIPTION
calm, cooperative    T(C): 36.7 (05-20-19 @ 14:15), Max: 36.7 (05-20-19 @ 14:15)  HR: 85 (05-20-19 @ 14:15) (85 - 85)  BP: 140/62 (05-20-19 @ 14:15) (140/62 - 140/62)  RR: 18 (05-20-19 @ 14:15) (18 - 18)  SpO2: 98% (05-20-19 @ 14:15) (98% - 98%)  Wt(kg): -- HTN, HLD, DM, hyperthyroidism lives with daughter and grandchildren until they left the house yesterday. retired. no current activities.

## 2019-05-20 NOTE — ED PROVIDER NOTE - PROGRESS NOTE DETAILS
Jai:  patient signed out to me pending TSH and BH eval.  TSH wnl, BH cleared patient for outpatient follow up.

## 2019-05-20 NOTE — ED BEHAVIORAL HEALTH ASSESSMENT NOTE - SUMMARY
63 yo F, domiciled with daughter and grandchildren (left home yesterday), retired, PMH of diabetes, HTN, hyperthyroidism, no formal PPH, never in psychiatric tx, no suicide attempts, no substance use, no legal hx, no violence hx, BIB EMS after daughter called 911 because pt said "I'm going to kill myself." Pt says that this statement was made out of anger and in the context of argument with her daughter. She denies any true SI/I/P. She is help-seeking and future-oriented and has no hx of suicidality. Pt does not warrant inpatient admission at this time and is appropriate for outpatient level of care.

## 2019-05-20 NOTE — ED BEHAVIORAL HEALTH ASSESSMENT NOTE - OTHER
daughter not observed "emotional" tearful arguments with daughter, daughter left house with grandchildren

## 2019-05-20 NOTE — ED ADULT TRIAGE NOTE - CHIEF COMPLAINT QUOTE
p/t with hx depression c/o of increased depression and SI no plan, p.t appears calm and cooperative @ present

## 2019-05-20 NOTE — ED BEHAVIORAL HEALTH ASSESSMENT NOTE - RISK ASSESSMENT
chronic risk factors: poor social supports (no friends, limited family), multiple chronic medical conditions, not engaged in work or other activities, not currently engaged in psychiatric treatment    acute risk factors: recent arguments with daughter, feeling isolated (daughter left house)    protective factors: no current SI/HI, future oriented, no hx of suicidality, no hx of violence, no substance use, no psychosis, no depression/anhedonia, no hx of trauma, no access to weapons, help-seeking    Pt has no current SI/HI and is not an imminent risk to self or others. She has many protective factors and is help-seeking and does not warrant involuntary inpatient admission at this time.

## 2019-05-20 NOTE — ED BEHAVIORAL HEALTH ASSESSMENT NOTE - HPI (INCLUDE ILLNESS QUALITY, SEVERITY, DURATION, TIMING, CONTEXT, MODIFYING FACTORS, ASSOCIATED SIGNS AND SYMPTOMS)
63 yo F, domiciled with daughter and grandchildren (left home yesterday), retired, PMH of diabetes, HTN, hyperthyroidism, no formal PPH, never in psychiatric tx, no suicide attempts, no substance use, no legal hx, no violence hx, BIB EMS after daughter called 911 because pt said "I'm going to kill myself."  Pt states that since Christmas 2018 when her daughter and the children went away for [place] instead of staying home with her, she has felt very hurt by her daughter and their relationship has suffered. 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 She had an argument with daughter on the phone today since daughter left the house with the children last night. Pt made a statement that she was going to kill herself; denies that she had any true SI and states that this was said out of anger. Pt came to the ED 2 days ago for similar presentation, and planned to visit a therapist or the crisis clinic today. She denies any present or hx of active or passive SI/I/P. She states that she would never try to kill herself because she wants to live for her grandchildren and herself. She denies recently depressed mood or anhedonia, stating that she is only emotional when she and her daughter argue. She denies change in sleep. Appetite has worsened x 2 days since she and daughter have been fighting more. She denies significant anxiety or panic attacks. Denies AVH/paranoia. Denies hx of period of decreased need for sleep. Denies HI.    Spoke with daughter Nidhi for collateral: She states that she and pt have been arguing more often and that it became too overwhelming for her and the kids and she needed to leave the home. She thinks that her mother may be depressed because she has been isolated from everyone except for her daughter and grandchildren; however she denies pt seeming depressed/sad and says that pt still enjoys the time with grandchildren. She thinks she is lonely and needs mental help. She states that pt wrote her a note a few months ago expressing that she was having intrusive and distressing thoughts of killing herself, and pt did not know why this was happening. Pt never expressed suicidal plan/intent and has no hx of harming herself or others. 65 yo F, domiciled with daughter and grandchildren (left home yesterday), retired, PMH of diabetes, HTN, hyperthyroidism, no formal PPH, never in psychiatric tx, no suicide attempts, no substance use, no legal hx, no violence hx, BIB EMS after daughter called 911 because pt said "I'm going to kill myself."  Pt states that since Christmas 2018 when her daughter and the children went away for [place] instead of staying home with her, she has felt very hurt by her daughter and their relationship has suffered. 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 She had an argument with daughter on the phone today since daughter left the house with the children last night. Pt made a statement that she was going to kill herself; denies that she had any true SI and states that this was said out of anger. Pt came to the ED 2 days ago for similar presentation, and planned to visit a therapist or the crisis clinic today. She denies any present or hx of active or passive SI/I/P. She states that she would never try to kill herself because she wants to live for her grandchildren and herself. She denies recently depressed mood or anhedonia, stating that she is only emotional when she and her daughter argue. She denies change in sleep. Appetite has worsened x 2 days since she and daughter have been fighting more. She denies significant anxiety or panic attacks. Denies AVH/paranoia. Denies hx of period of decreased need for sleep. Denies HI.    Spoke with daughter Nidhi for collateral: She states that she and pt have been arguing more often and that it became too overwhelming for her and the kids; she needed to leave the home and did so yesterday. She thinks that her mother may be depressed because she has been isolated from everyone except for her daughter and grandchildren and has no outside interests/activities; however she denies pt seeming depressed/sad and says that pt still enjoys the time with grandchildren. She thinks pt is lonely and needs mental help. She states that pt wrote her a note a few months ago expressing that she was having intrusive and distressing thoughts of killing herself, and pt did not know why this was happening. Pt never expressed suicidal plan/intent and has no hx of harming herself or others.

## 2019-05-20 NOTE — ED BEHAVIORAL HEALTH ASSESSMENT NOTE - DETAILS
made suicidal statement, denies any true suicidality. Per daughter, recent intrusive thoughts of active SI without plan/intent. No hx of SIB or suicide attempt. spoke with provider

## 2019-05-20 NOTE — ED PROVIDER NOTE - OBJECTIVE STATEMENT
adan: hx from pt.   since december 2018 pt has been having difficulties maintaining relationship with daughter. Pt denies any BH past hx and not in any psychological therapy or medications. She became upset at daughter today and indicated to friends/family that  she wanted to die (no specific plan). pt states she doenst want to hurt self; just upset. was in ED for similar sx 2 days ago and discharged.   pmh includes thyroid disease, htn and DM

## 2019-05-20 NOTE — ED PROVIDER NOTE - CLINICAL SUMMARY MEDICAL DECISION MAKING FREE TEXT BOX
adan: pt with family problems who indicated wanting to die to friends/family, but states she has no desire to hurt self adan: pt with family problems who indicated wanting to die to friends/family, but states she has no desire to hurt self at present. second ED visit in 2 days.   will page psychiatry for consult.

## 2019-05-20 NOTE — ED BEHAVIORAL HEALTH NOTE - BEHAVIORAL HEALTH NOTE
Worker met with patient who agreed to [facility] [place].  She states that she has [facility] Medicaid. She states that she can be reached on [phone] and voicemail can be left. Worker added patient on  log. Worker called [facility] geriatric clinic and patient cannot follow up there because of [facility] insurance which is not accepted.

## 2019-05-22 NOTE — ED BEHAVIORAL HEALTH NOTE - BEHAVIORAL HEALTH NOTE
Call received from Janice Armas at the [facility] at [address] [facility] [address] [phone], fx [phone]. She can be seen on 5/30 at 1230.  Patient notified at [phone], she verbally contracts to attend.

## 2019-05-22 NOTE — ED BEHAVIORAL HEALTH NOTE - BEHAVIORAL HEALTH NOTE
Worker  called back call center at  [employer] [phone] and spoke to Breonna who states fax was not received and worker should re-fax packet. [email or web address]

## 2019-05-23 ENCOUNTER — OUTPATIENT (OUTPATIENT)
Dept: OUTPATIENT SERVICES | Facility: HOSPITAL | Age: 64
LOS: 1 days | Discharge: STILL ADMITTED OR ADMINISTRATIVE | End: 2019-05-23

## 2019-05-23 DIAGNOSIS — Z98.890 OTHER SPECIFIED POSTPROCEDURAL STATES: Chronic | ICD-10-CM

## 2019-05-23 DIAGNOSIS — Z98.51 TUBAL LIGATION STATUS: Chronic | ICD-10-CM

## 2019-05-23 NOTE — ED BEHAVIORAL HEALTH NOTE - BEHAVIORAL HEALTH NOTE
Call received on SW line from Janice [facility] due to need to change previously scheduled outpatient appointment. Janice reported that [facility] is not currently accepting medicare. Pt's appointment changed to [facility] (accepting insurance) on 5/30 at 11:30am. Address is [address] [place] [address] with telephone # [phone]. VM left for pt notifying her of the above. Pt instructed to call  line at [phone] with any questions.

## 2019-05-24 DIAGNOSIS — F32.9 MAJOR DEPRESSIVE DISORDER, SINGLE EPISODE, UNSPECIFIED: ICD-10-CM

## 2019-06-18 ENCOUNTER — APPOINTMENT (OUTPATIENT)
Dept: OBGYN | Facility: CLINIC | Age: 64
End: 2019-06-18
Payer: MEDICARE

## 2019-06-18 VITALS
WEIGHT: 138 LBS | SYSTOLIC BLOOD PRESSURE: 121 MMHG | DIASTOLIC BLOOD PRESSURE: 74 MMHG | HEIGHT: 62 IN | HEART RATE: 80 BPM | BODY MASS INDEX: 25.4 KG/M2

## 2019-06-18 PROCEDURE — 99397 PER PM REEVAL EST PAT 65+ YR: CPT
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
## 2019-06-18 NOTE — PHYSICAL EXAM
[Awake] : awake [Alert] : alert [Acute Distress] : no acute distress [Mass] : no breast mass [Thyroid Nodule] : thyroid nodule [Axillary LAD] : no axillary lymphadenopathy [Nipple Discharge] : no nipple discharge [Tender] : non tender [Soft] : soft [Oriented x3] : oriented to person, place, and time [Normal] : uterus [No Bleeding] : there was no active vaginal bleeding [Atrophy] : atrophy [Uterine Adnexae] : were not tender and not enlarged [Normal Position] : in a normal position

## 2020-01-12 ENCOUNTER — EMERGENCY (EMERGENCY)
Facility: HOSPITAL | Age: 65
LOS: 1 days | Discharge: HOME | End: 2020-01-12
Admitting: EMERGENCY MEDICINE
Payer: MEDICARE

## 2020-01-12 VITALS
OXYGEN SATURATION: 100 % | TEMPERATURE: 98 F | RESPIRATION RATE: 18 BRPM | HEART RATE: 101 BPM | DIASTOLIC BLOOD PRESSURE: 74 MMHG | SYSTOLIC BLOOD PRESSURE: 152 MMHG

## 2020-01-12 DIAGNOSIS — Z98.51 TUBAL LIGATION STATUS: Chronic | ICD-10-CM

## 2020-01-12 DIAGNOSIS — Z98.890 OTHER SPECIFIED POSTPROCEDURAL STATES: Chronic | ICD-10-CM

## 2020-01-12 PROCEDURE — 99283 EMERGENCY DEPT VISIT LOW MDM: CPT
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
## 2020-01-12 NOTE — ED PROVIDER NOTE - CLINICAL SUMMARY MEDICAL DECISION MAKING FREE TEXT BOX
63 y/o  M hx  HTN, HLD, DM, Hypothyroid  Medical evaluation performed. There is no clinical evidence of intoxication or any acute medical problem requiring immediate intervention.  Recommend following up with [facility].
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
## 2020-01-12 NOTE — ED PROVIDER NOTE - NSFOLLOWUPCLINICS_GEN_ALL_ED_FT
[facility]  Behavioral Health  [address] [place] [address]  Phone: [phone]  Fax:   Follow Up Time:

## 2020-01-12 NOTE — ED PROVIDER NOTE - PROGRESS NOTE DETAILS
Dr. Bronson: As per hospital protocol, this patient was managed by the NP exclusively and I am signing as a matter of protocol.  I did not participate in the care of this patient, nor was I aware of the evaluation or plan until the time this chart was signed.

## 2020-01-12 NOTE — ED PROVIDER NOTE - PATIENT PORTAL LINK FT
You can access the FollowMyHealth Patient Portal offered by [facility] by registering at the following website: [email or web address]. By joining [facility]’s FollowMyHealth portal, you will also be able to view your health information using other applications (apps) compatible with our system.

## 2020-01-12 NOTE — ED ADULT NURSE REASSESSMENT NOTE - NS ED NURSE REASSESS COMMENT FT1
Recorded as Task   Date: 08/07/2017 12:15 PM, Created By: JACKSON,PATIENT   Task Name: ePat Message   Assigned To: CARO FUNES   Regarding Patient: EZRA VALDEZ, Status: Active   Comment:    JACKSON,PATIENT - 07 Aug 2017 12:15 PM     [id number]**date:[id number]    Ask a Medical Question  Inquiry: Rafael Easton - This is ShawnaYeison castro's wife. I am writing to find out if you would be willing to write on your prescription pad some supplements for Yeison. We follow your videos and I am hoping that you would be willing to do this for him as you do recommend some supplements.  When I lost my job and Yeison's Parkinson's got worse, it was suggested to me that I [facility] to see if there was something they might be able to do for him as they are on the cutting edge of treatment because of Agent Orange.  I can no longer afford Yeison's supplements.  He is allergic to the [employer].  The only thing helping him has been supplements.  The doctors at the [facility] do not use prescription pads as there is an in-house pharmacy at [place].  I am trying to apply for a [facility] program called Aid and Assistance which would give us a little help with Yeison. In order for the [facility] to pay a portion of the supplement cost, the supplements would have to be written on a Prescription Pad by a doctor who knows Yeison.  I am referring to supplements such as CoQ10, etc.  Would you be willing to help us with this?  If you can, I will send you a list of the supplements that I was purchasing for Yeison at a health food store.  We are open to any supplement suggestions you think might help him.  Thank you.  --Shawna Darleen Bunn - 07 Aug 2017 12:44 PM     UNDELEGATED TASK        Signatures   Electronically signed by : CARO FUNES, ; Aug  8 2017  6:56PM CST     Pt calm and cooperative with breathing even and unlabored. Pt discharged safely at this time by MD.

## 2021-08-01 NOTE — ED PROVIDER NOTE - PSYCHIATRIC RISK
[FreeTextEntry1] : 1) Type 2 DM:  Glycemic control seems to have improved, with a decrease in A1c level to 9.0% despite the intermittent spikes in his fingerstick values to > 300 mg%.  He has also gained back most of his lost weight, and is up 15 lb since his last visit. Would assume that elevated fingersticks before lunch likely reflect missed doses of insulin before breakfast (since his wife does not often supervise this injection), but that spikes after lunch and supper reflect his eating dessert at those meals (despite the fact that his wife is usually with him at those times).\par --Despite the intermittently elevated fingersticks, will not increase his insulin doses, which are already quite high\par --Pt's wife will hopefully continue supervising as many of his insulin injections as possible, but the prognosis for further improvement is guarded at best.  \par --Was again cautioned to avoid injecting into the lipohypertrophic areas of his abdominal wall\par \par 2) Hypercholesterolemia:  LDL-cholesterol level is only a trace above his target of 70 mg%.\par --Continue atorvastatin 80 mg/day\par \par 3) Hypertension:  BP is satisfactory at today's visit.\par --Continue combination therapy with enalapril, HCTZ and metoprolol\par \par 4) Hypothyroidism:  TSH level is now in the normal range.\par --Continue to follow with serial TFTs\par \par See for follow-up in 4 months.  CMP, lipids, A1c, TFTs, microalb before the visit [FreeTextEntry2] : Injection sites no verbalization of thoughts of harm

## 2021-09-08 ENCOUNTER — APPOINTMENT (OUTPATIENT)
Dept: OBGYN | Facility: CLINIC | Age: 66
End: 2021-09-08
Payer: MEDICARE

## 2021-09-08 VITALS
HEART RATE: 78 BPM | WEIGHT: 137 LBS | DIASTOLIC BLOOD PRESSURE: 81 MMHG | SYSTOLIC BLOOD PRESSURE: 135 MMHG | HEIGHT: 62 IN | BODY MASS INDEX: 25.21 KG/M2 | TEMPERATURE: 96.1 F

## 2021-09-08 PROCEDURE — G0101: CPT

## 2021-09-08 PROCEDURE — 99397 PER PM REEVAL EST PAT 65+ YR: CPT

## 2021-09-08 RX ORDER — SODIUM SULFATE, POTASSIUM SULFATE, MAGNESIUM SULFATE 17.5; 3.13; 1.6 G/ML; G/ML; G/ML
17.5-3.13-1.6 SOLUTION, CONCENTRATE ORAL
Qty: 354 | Refills: 0 | Status: COMPLETED | COMMUNITY
Start: 2018-04-23 | End: 2021-09-08

## 2021-09-08 RX ORDER — COLESEVELAM HYDROCHLORIDE 625 MG/1
625 TABLET, FILM COATED ORAL
Qty: 540 | Refills: 0 | Status: COMPLETED | COMMUNITY
Start: 2018-03-27 | End: 2021-09-08

## 2021-09-08 RX ORDER — SITAGLIPTIN AND METFORMIN HYDROCHLORIDE 50; 1000 MG/1; MG/1
50-1000 TABLET, FILM COATED, EXTENDED RELEASE ORAL
Qty: 180 | Refills: 0 | Status: COMPLETED | COMMUNITY
Start: 2017-09-12 | End: 2021-09-08

## 2021-09-08 RX ORDER — EXENATIDE 2 MG/.85ML
2 INJECTION, SUSPENSION, EXTENDED RELEASE SUBCUTANEOUS
Qty: 10 | Refills: 0 | Status: COMPLETED | COMMUNITY
Start: 2018-03-27 | End: 2021-09-08

## 2021-09-08 RX ORDER — CIPROFLOXACIN HYDROCHLORIDE 500 MG/1
500 TABLET, FILM COATED ORAL
Qty: 20 | Refills: 0 | Status: COMPLETED | COMMUNITY
Start: 2018-03-27 | End: 2021-09-08

## 2021-09-08 RX ORDER — INSULIN GLARGINE 100 [IU]/ML
100 INJECTION, SOLUTION SUBCUTANEOUS
Qty: 30 | Refills: 0 | Status: COMPLETED | COMMUNITY
Start: 2017-12-18 | End: 2021-09-08

## 2021-09-08 RX ORDER — VARENICLINE TARTRATE 1 MG/1
1 TABLET, FILM COATED ORAL
Qty: 168 | Refills: 0 | Status: COMPLETED | COMMUNITY
Start: 2018-03-28 | End: 2021-09-08

## 2021-09-08 RX ORDER — BRIMONIDINE TARTRATE, TIMOLOL MALEATE 2; 5 MG/ML; MG/ML
0.2-0.5 SOLUTION/ DROPS OPHTHALMIC
Qty: 10 | Refills: 0 | Status: COMPLETED | COMMUNITY
Start: 2018-05-08 | End: 2021-09-08

## 2021-09-08 RX ORDER — VARENICLINE TARTRATE 0.5 (11)-1
0.5 MG X 11 & KIT ORAL
Qty: 53 | Refills: 0 | Status: COMPLETED | COMMUNITY
Start: 2018-03-28 | End: 2021-09-08

## 2021-09-08 RX ORDER — BLOOD SUGAR DIAGNOSTIC
STRIP MISCELLANEOUS
Qty: 400 | Refills: 0 | Status: COMPLETED | COMMUNITY
Start: 2018-03-27 | End: 2021-09-08

## 2021-09-08 RX ORDER — AZITHROMYCIN 250 MG/1
250 TABLET, FILM COATED ORAL
Qty: 6 | Refills: 0 | Status: COMPLETED | COMMUNITY
Start: 2017-12-18 | End: 2021-09-08

## 2021-09-08 NOTE — HISTORY OF PRESENT ILLNESS
[FreeTextEntry1] : 66 y.o. P 1021 postmenopausal female for annual exam. , pt feels well. , pt has HTN, DM, PUD, and benign nodular goiter, , PCP is Dr. Barber,  Endo is Dr. Ann pap is due today, colonoscopy  is next due in 2023, pt is due for mammo and Dexa. pt is on meds for HTN, Diabetes and ASA. \par pt has been vaccinated with Pfizer, \par pt c/o left hip pain , exacerbated by walking

## 2021-09-08 NOTE — DISCUSSION/SUMMARY
[FreeTextEntry1] : A - WWV\par      left hip pain\par      benign nodular goiter\par     HTN\par     Diabetes\par     Menopause\par \par P- f/u 1 year\par     pap done\par    referrals for mammo, sono of Thyroid, left hip X-Ray, , Dexa - given for [facility]\par     exercise encouraged\par     nutritional counseling provided\par     next colonoscopy due in 2023\par     f/u with PCP Dr. Sunil quintana. \par     f/u with Endocrinologist  , Dr. Seth quintana.

## 2021-09-14 LAB
CYTOLOGY CVX/VAG DOC THIN PREP: ABNORMAL
HPV HIGH+LOW RISK DNA PNL CVX: NOT DETECTED

## 2021-12-07 ENCOUNTER — NON-APPOINTMENT (OUTPATIENT)
Age: 66
End: 2021-12-07

## 2022-01-04 NOTE — ED ADULT TRIAGE NOTE - ESI TRIAGE ACUITY LEVEL, MLM
Problem: Adult Inpatient Plan of Care  Goal: Plan of Care Review  Outcome: Ongoing, Progressing  Goal: Patient-Specific Goal (Individualized)  Outcome: Ongoing, Progressing  Goal: Absence of Hospital-Acquired Illness or Injury  Outcome: Ongoing, Progressing  Goal: Optimal Comfort and Wellbeing  Outcome: Ongoing, Progressing  Goal: Readiness for Transition of Care  Outcome: Ongoing, Progressing     Problem: Adjustment to Illness COPD (Chronic Obstructive Pulmonary Disease)  Goal: Optimal Chronic Illness Coping  Outcome: Ongoing, Progressing     Problem: Functional Ability Impaired COPD (Chronic Obstructive Pulmonary Disease)  Goal: Optimal Level of Functional [place]  Outcome: Ongoing, Progressing     Problem: Infection COPD (Chronic Obstructive Pulmonary Disease)  Goal: Absence of Infection Signs and Symptoms  Outcome: Ongoing, Progressing     Problem: Oral Intake Inadequate COPD (Chronic Obstructive Pulmonary Disease)  Goal: Improved Nutrition Intake  Outcome: Ongoing, Progressing     Problem: Respiratory Compromise COPD (Chronic Obstructive Pulmonary Disease)  Goal: Effective Oxygenation and Ventilation  Outcome: Ongoing, Progressing     Problem: Fluid and Electrolyte Imbalance (Acute Kidney Injury/Impairment)  Goal: Fluid and Electrolyte Balance  Outcome: Ongoing, Progressing     Problem: Oral Intake Inadequate (Acute Kidney Injury/Impairment)  Goal: Optimal Nutrition Intake  Outcome: Ongoing, Progressing     Problem: Renal Function Impairment (Acute Kidney Injury/Impairment)  Goal: Effective Renal Function  Outcome: Ongoing, Progressing     Problem: Fluid Imbalance (Pneumonia)  Goal: Fluid Balance  Outcome: Ongoing, Progressing     Problem: Infection (Pneumonia)  Goal: Resolution of Infection Signs and Symptoms  Outcome: Ongoing, Progressing     Problem: Respiratory Compromise (Pneumonia)  Goal: Effective Oxygenation and Ventilation  Outcome: Ongoing, Progressing     Problem: Fall Injury Risk  Goal:  Absence of Fall and Fall-Related Injury  Outcome: Ongoing, Progressing   Cardiology Step Down. See progress note and flowsheet. Plan: Continue to monitor patient and report any abnormalities in labs, vitals signs, and body system functions to physician as indicated. Patient was given education on their disease process and medications.     3

## 2022-10-15 ENCOUNTER — NON-APPOINTMENT (OUTPATIENT)
Age: 67
End: 2022-10-15

## 2022-10-20 NOTE — ED BEHAVIORAL HEALTH ASSESSMENT NOTE - NS ED BHA MED ROS ENDOCRINE
Detail Level: Simple
Additional Notes: Patient consent was obtained to proceed with the visit and recommended plan of care after discussion of all risks and benefits, including the risks of COVID-19 exposure.
No complaints

## 2022-11-15 ENCOUNTER — APPOINTMENT (OUTPATIENT)
Dept: OBGYN | Facility: CLINIC | Age: 67
End: 2022-11-15

## 2022-11-15 VITALS
BODY MASS INDEX: 24.66 KG/M2 | SYSTOLIC BLOOD PRESSURE: 135 MMHG | DIASTOLIC BLOOD PRESSURE: 72 MMHG | HEIGHT: 62 IN | WEIGHT: 134 LBS | HEART RATE: 84 BPM

## 2022-11-15 DIAGNOSIS — Z01.419 ENCOUNTER FOR GYNECOLOGICAL EXAMINATION (GENERAL) (ROUTINE) W/OUT ABNORMAL FINDINGS: ICD-10-CM

## 2022-11-15 DIAGNOSIS — E04.1 NONTOXIC SINGLE THYROID NODULE: ICD-10-CM

## 2022-11-15 PROCEDURE — 99397 PER PM REEVAL EST PAT 65+ YR: CPT

## 2022-11-15 NOTE — HISTORY OF PRESENT ILLNESS
[FreeTextEntry1] : 67 y.o. P 1021 postmenopausal female for annual exam pt feels well, , pt has HTN, DM, PUD, and a benign nodular goiter, \par PCP is Uri Barber. \par Dexa next due in 2023\par pap next due  in 2023, \par colonoscopy duein 2023.

## 2022-11-15 NOTE — DISCUSSION/SUMMARY
[FreeTextEntry1] : A - WWV\par       HTN\par       DM\par      PUD\par    atrophic vaginitis\par     Thyroid goiter\par \par p- f/u for results of recent Thyroid  imaging, done 11/10/22 at [facility] on [facility]. \par     referral given for mammo and breast sono\par      colonoscopy, pap and Dexa next due in 2023\par      exercise encouraged\par     nutritional counseling provided,

## 2022-11-25 NOTE — ED BEHAVIORAL HEALTH ASSESSMENT NOTE - OTHER PAST PSYCHIATRIC HISTORY (INCLUDE DETAILS REGARDING ONSET, COURSE OF ILLNESS, INPATIENT/OUTPATIENT TREATMENT)
[place] SUBSTANCE USE DISORDER (LEANNA) [facility] HOSPITAL PROGRAM ([facility]) ADDICTION MEDICINE/ PSYCHIATRY DISCHARGE SUMMARY      Patient:  Oralia Lindsay Date OF Service:  2022   :  1975 Attending:  Jareth Mejia MD   47 year old female      CPT Code and Time:  52694 / >30 minutes spent in discharge activities.     Date of admission: 11/10/22     Date of discharge: 22       Reason for Hospitalization:  Treatment of substance use and mental health concerns. Please see initial assessment for additional details.    Admitting Diagnosis:     1. Alcohol use disorder, severe   2. Major depressive disorder, recurrent, moderate   3. Generalized anxiety disorder  4. Post traumatic stress disorder, chronic   5. Tobacco use disorder, severe   6. Cannabis use disorder, severe, in sustained remission     Discharge Diagnosis:     1. Alcohol use disorder, severe   2. Major depressive disorder, recurrent, moderate   3. Generalized anxiety disorder  4. Post traumatic stress disorder, chronic   5. Tobacco use disorder, severe   6. Cannabis use disorder, severe, in sustained remission       Significant Findings from Mental Status Exam:  Please see initial assessment for additional details.    Medical History:    Past Medical History:   Diagnosis Date   • Alcohol abuse    • Anxiety disorder    • Cervical cancer (CMS/[facility]ly [facility])    • Depression    • Diabetes mellitus (CMS/Formerly [facility])    • GERD (gastroesophageal reflux disease)    • History of substance abuse (CMS/Formerly [facility]) 8/10/2021   • Hypertension    • Severe cannabis use disorder (CMS/Formerly [facility]) 2020   • Spinal stenosis    • Substance abuse, daily use (CMS/Formerly [facility]) 2021   • Substance use 2021     PCP - SHANI Sheth     .  ALLERGIES:   Allergen Reactions   • Tramadol SEIZURES   • Solifenacin Succinate SWELLING       Consultations:  History and Physical Examination refer to consultation.    Laboratory Tests:  Lab Results   Component Value Date    WBC 15.0 (H) 2022     HGB 16.1 (H) 09/25/2022    HCT 47.9 (H) 09/25/2022     09/25/2022    SODIUM 135 11/11/2022    POTASSIUM 4.2 11/11/2022    CHLORIDE 103 11/11/2022    CO2 19 (L) 11/11/2022    GLUCOSE 222 (H) 11/11/2022    BUN 19 11/11/2022    CREATININE 0.83 11/11/2022    CALCIUM 9.1 11/11/2022    ALBUMIN 3.7 11/11/2022    MG 1.5 (L) 11/11/2022    BILIRUBIN 0.4 11/11/2022    ALKPT 95 11/11/2022    AST <5 11/11/2022    GPT 15 11/11/2022     Urine Drug Tests -     Urine drug tests -         Urine drug screen - point of care (11/09/22)      Urine drug screen was positive for benzodiazepines (sample tested for Buprenorphine, Benzodiazepines, THC, Cocaine, Opioids, Oxycodone, Methamphetamine, Amphetamine, Methadone, MDMA, PCP, Barbiturates, Tricyclic antidepressants). Sample was sent to outside lab for further testing and confirmation including detection of metabolites of alcohol.     Urine drug screen - point of care (11/10/22)      Urine drug screen was positive for benzodiazepines (sample tested for Buprenorphine, Benzodiazepines, THC, Cocaine, Opioids, Oxycodone, Methamphetamine, Amphetamine, Methadone, MDMA, PCP, Barbiturates, Tricyclic antidepressants). Sample was sent to outside lab for further testing and confirmation including detection of metabolites of alcohol.     Urine drug screen - point of care (11/14/22)      Urine drug screen was positive for benzodiazepines (sample tested for Buprenorphine, Benzodiazepines, THC, Cocaine, Opioids, Oxycodone, Methamphetamine, Amphetamine, Methadone, MDMA, PCP, Barbiturates, Tricyclic antidepressants). Sample was sent to outside lab for further testing and confirmation including detection of metabolites of alcohol.        Urine drug screen - point of care (11/15/22)     Urine drug screen was negative for all tested substances (sample tested for Buprenorphine, Benzodiazepines, THC, Cocaine, Opioids, Oxycodone, Methamphetamine, Amphetamine, Methadone, MDMA, PCP, Barbiturates,  Tricyclic antidepressants). Sample was sent to outside lab for further testing and confirmation including detection of metabolites of alcohol.     Urine drug screen - point of care (11/21/22)     Urine drug screen was negative for all tested substances (sample tested for Buprenorphine, Benzodiazepines, THC, Cocaine, Opioids, Oxycodone, Methamphetamine, Amphetamine, Methadone, MDMA, PCP, Barbiturates, Tricyclic antidepressants). Sample was sent to outside lab for further testing and confirmation including detection of metabolites of alcohol.    Urine drug screen - point of care (11/25/22)     Urine drug screen was negative for all tested substances (sample tested for Buprenorphine, Benzodiazepines, THC, Cocaine, Opioids, Oxycodone, Methamphetamine, Amphetamine, Methadone, MDMA, PCP, Barbiturates, Tricyclic antidepressants). Sample was sent to outside lab for further testing and confirmation including detection of metabolites of alcohol.    Urine drug screen - point of care (11/28/22)     Urine drug screen was negative for all tested substances (sample tested for Buprenorphine, Benzodiazepines, THC, Cocaine, Opioids, Oxycodone, Methamphetamine, Amphetamine, Methadone, MDMA, PCP, Barbiturates, Tricyclic antidepressants). Sample was sent to outside lab for further testing and confirmation including detection of metabolites of alcohol.     Confirmatory drug tests -      Urine Drug Test (LC/MS): 11/09/22      Positive for Oxazepam (93 ng/ml), Temazepam (34 ng/ml) (negative for Ethyl Glucuronide)         Urine Drug Test (LC/MS): 11/10/22      Positive for Oxazepam (73 ng/ml), Temazepam (26 ng/ml) (negative for Ethyl Glucuronide)     Urine Drug Test (LC/MS): 11/14/22      Positive for Oxazepam (80 ng/ml), Temazepam (25 ng/ml) (negative for Ethyl Glucuronide)     Urine Drug Test (LC/MS): 11/15/22     No confirmatory results available     Urine Drug Test (LC/MS): 11/21/22      Positive for Oxazepam (76 ng/ml) (negative for  Ethyl Glucuronide)      Urine Drug Test (LC/MS): 11/25/22       (negative for Ethyl Glucuronide)  (benzodiazepines - pending)          Hospital Course:      Oralia was admitted to the substance use disorder partial hospital program (PHP) for treatment of substance use and mental health concerns. She had earlier been admitted to residential program at [facility]- was discharged due to being COVID positive. She engaged well in PHP and found the program to be helpful. She maintained abstinence from alcohol use as evident from urine drug tests with confirmation twice/week. She attended community support meetings. She was started on Naltrexone, nicotine transdermal and nicotine gum and Vivitrol which she tolerated well. She was maintained on Cymbalta, Prazosin, Propranolol, Hydroxyzine, Topamax, Remeron dose was increased. She tolerated medications well. She missed 2 days of the program due to ongoing physical health concerns - extended her days in PHP. She continued to show improvement and was discharged from [facility] in a stable and improved condition with recommendations to continue treatment in [facility].       Condition of Patient on Discharge (Problem based or Mental Status):  Stable       MENTAL STATUS EXAM:    Appearance and behavior: pleasant and cooperative  Eye contact:  good   Speech:  Regular in volume, rate and tone.    Mood: anxious    Affect:  Congruent.  Associations: intact   Thought Process:  Logical, coherent and goal-directed  Thought Content:  No evidence of obsessions, delusions  Perceptual abnormalities: no perceptual abnormalities detected  Suicidal Ideation: none  Homicidal Ideation: none  Orientation: oriented to time, place and person  Memory: recent and remote memories intact  Language: no evidence of aphasia  Attention and concentration:adequate  Insight:  fair  Judgement:  fair     Risk Status:  relapse/potential: moderate and suicidal: low    Post Hospitalization Plan:      Substance use disorder -  Intensive Outpatient Program (IOP)    Follow up with Dr. Saxena on 12/02/22 (Fri) at 5 pm        Discharge On:  11/28/2022    Discharge To:  Self    Next Level of Care Recommended:     Substance use disorder - Intensive Outpatient Program (IOP)      Discharge Medications:        Summary of your Discharge Medications      Take these Medications      Details   albuterol 108 (90 Base) MCG/ACT inhaler   Inhale 2 puffs into the lungs every 4 hours as needed for Shortness of Breath or Wheezing.     doxycycline monohydrate 100 MG capsule  Commonly known as: MONODOX   Take 1 capsule by mouth in the morning and 1 capsule in the evening. Do all this for 10 days.     DULoxetine 60 MG capsule  Commonly known as: CYMBALTA   Take 2 capsules by mouth daily.     hydrOXYzine 50 MG tablet  Commonly known as: ATARAX   Take 1 tablet by mouth every 6 hours as needed for Anxiety.     metformin 1000 MG tablet  Commonly known as: GLUCOPHAGE   Take 1 tablet by mouth in the morning and 1 tablet in the evening. Take with meals.     mirtazapine 30 MG tablet  Commonly known as: REMERON   Take 1 tablet by mouth nightly.  Comment: Dose increase     nalTREXone 50 MG tablet  Commonly known as: REVIA   Take 1 tablet by mouth daily.  Comment: F10.20     naproxen 500 MG tablet  Commonly known as: NAPROSYN   Take 1 tablet by mouth 2 times daily as needed for moderate to severe pain.     nicotine polacrilex 4 MG gum  Commonly known as: NICORETTE   Take 1 each by mouth as needed for Smoking cessation.  Comment: F17.200     Nicotine Step 1 21 MG/24HR patch   Generic drug: nicotine  Place 1 patch onto the skin every 24 hours.  Comment: F17.200     nitroGLYCERIN 0.4 MG sublingual tablet  Commonly known as: NITROSTAT   Place 1 tablet under the tongue every 5 minutes as needed for Chest pain. Call 911.     Ozempic (0.25 or 0.5 MG/DOSE) (1.34 mg/ml) 0.25 or 0.5 MG/DOSE injection   Generic drug: semaglutide(0.25 or 0.5 mg/DOSE)  Inject 0.25 mg into the skin  every 7 days.     pantoprazole 40 MG tablet  Commonly known as: PROTONIX   Take 1 tablet by mouth daily (before breakfast).     pravastatin 80 MG tablet  Commonly known as: PRAVACHOL   Take 0.5 tablets by mouth at bedtime.     prazosin 2 MG capsule  Commonly known as: MINIPRESS   Take 1 capsule by mouth nightly.     propRANolol 80 MG 24 hr capsule  Commonly known as: INDERAL LA   Take 2 capsules by mouth daily.     Samia-Annalise Tab   Take 1 tablet by mouth daily.     sucralfate 1 g tablet  Commonly known as: CARAFATE   Take 1 tablet by mouth 2 times daily before meals     topiramate 100 MG tablet  Commonly known as: TOPAMAX   Take 1 tablet by mouth in the morning and 1 tablet in the evening.     vitamin B-12 1000 MCG tablet  Commonly known as: CYANOCOBALAMIN   Take 1 tablet by mouth daily.     Vivitrol 380 MG injection   Generic drug: nalTREXone  Inject 380 mg into the muscle every 28 days.  Comment: F10.20        ASK your doctor about these medications      Details   guaiFENesin 600 MG 12 hr tablet  Commonly known as: MUCINEX  Ask about: Should I take this medication?   Take 2 tablets by mouth in the morning and 2 tablets in the evening. Do all this for 5 days.            Patient is on two or more scheduled antipsychotic agents:  No      Diet:  General    Activity Level:  As tolerated, no restrictions    Legal:  Not Applicable    Statement of Hospitalization:  Not Applicable      This information is confidential and disclosure without patient consent or statutory authorization is prohibited by law.    [facility] LEANNA Placement Criteria    1. Withdrawal Potential:  Level II.1: Intensive Outpatient - At minimum risk of severe withdrawal.    2. Biomedical Conditions and Complications: Level II.1: Intensive Outpatient - None or not a distraction from treatment.  Manageable at IOP level.    3. Emotional, Behavioral or Cognitive Conditions and Complications: Level II.1: Intensive Outpatient - Mild severity, with the potential to  distract from recovery; patient needs monitoring.    4. Readiness to Change:  Level II.1: Intensive Outpatient - Patient has variable engagement in treatment. Ambivalence,or lackof awareness of a problem - needs structured program several times a week.    5. Relapse, Continued Use, or Continued Problem Potential Recovery Environment: Level II.1: Intensive Outpatient - Intensified symptoms high likelihood of relapse without close monitoring & support several times per week.    6. Recovery Environment:  Level II.1: Intensive Outpatient - Recovery environment is not supportive, but with structure and support, the patient can cope.        This information has been disclosed to you from records protected by Federal confidentiality rules (42 CFR part 2).  The Federal rules prohibit you from making any further disclosure of this information unless further disclosure is expressly permitted by the written consent of the person to whom it pertains or as otherwise permitted by 42 CFT part2.  A general authorization for the release of medical or other information is NOT sufficient for this purpose.   The Federal rules restrict any use of the information to criminally investigate or prosecute any patients with substance use disorders.        none

## 2023-06-06 ENCOUNTER — APPOINTMENT (OUTPATIENT)
Dept: ENDOCRINOLOGY | Facility: CLINIC | Age: 68
End: 2023-06-06

## 2023-06-16 ENCOUNTER — APPOINTMENT (OUTPATIENT)
Dept: OBGYN | Facility: CLINIC | Age: 68
End: 2023-06-16
Payer: MEDICARE

## 2023-06-16 VITALS
HEART RATE: 80 BPM | DIASTOLIC BLOOD PRESSURE: 77 MMHG | SYSTOLIC BLOOD PRESSURE: 138 MMHG | HEIGHT: 62 IN | BODY MASS INDEX: 24.88 KG/M2 | WEIGHT: 135.2 LBS

## 2023-06-16 DIAGNOSIS — N95.0 POSTMENOPAUSAL BLEEDING: ICD-10-CM

## 2023-06-16 PROCEDURE — 99213 OFFICE O/P EST LOW 20 MIN: CPT | Mod: 25

## 2023-06-16 PROCEDURE — 58100 BIOPSY OF UTERUS LINING: CPT

## 2023-06-16 RX ORDER — IBUPROFEN 600 MG/1
600 TABLET, FILM COATED ORAL
Qty: 0 | Refills: 0 | Status: COMPLETED | OUTPATIENT
Start: 2023-06-16

## 2023-06-16 RX ADMIN — IBUPROFEN 0 MG: 600 TABLET, FILM COATED ORAL at 00:00

## 2023-06-16 NOTE — PROCEDURE
[Endometrial Biopsy] : Endometrial biopsy [Time out performed] : Pre-procedure time out performed.  Patient's name, date of birth and procedure confirmed. [Consent Obtained] : Consent obtained [Post-Menop. Bleeding] : post-menopausal bleeding [Risks] : risks [Benefits] : benefits [Alternatives] : alternatives [Patient] : patient [Infection] : infection [Bleeding] : bleeding [Allergic Reaction] : allergic reaction [Uterine Perforation] : uterine perforation [Pain] : pain [N/A] : pregnancy test not applicable [Betadine] : Betadine [None] : none [Tenaculum] : Tenaculum [Required Dilation] : required dilation [Mid Position] : mid position [Scant] : scant [Specimen Collected] : collected [Sent to Pathology] : placed in buffered formalin and sent for pathology [Tolerated Well] : Patient tolerated the procedure well [No Complications] : No complications [de-identified] : Os finder

## 2023-06-16 NOTE — PLAN
[FreeTextEntry1] : 67y/o presents with 1.5 weeks of post-menopausal bleeding\par \par #PMB\par -EMB performed today\par -Tylenol/motrin for pain management\par -Will f/u pathology\par \par kaia CASTRO

## 2023-06-16 NOTE — PHYSICAL EXAM
[Chaperone Present] : A chaperone was present in the examining room during all aspects of the physical examination [FreeTextEntry1] : KETURAH Hollingsworth [Appropriately responsive] : appropriately responsive [Alert] : alert [No Acute Distress] : no acute distress [No Lymphadenopathy] : no lymphadenopathy [Regular Rate Rhythm] : regular rate rhythm [No Murmurs] : no murmurs [Clear to Auscultation B/L] : clear to auscultation bilaterally [Soft] : soft [Non-tender] : non-tender [Non-distended] : non-distended [No HSM] : No HSM [No Lesions] : no lesions [No Mass] : no mass [Oriented x3] : oriented x3 [Labia Majora] : normal [Labia Minora] : normal [Normal] : normal

## 2023-06-23 LAB — CORE LAB BIOPSY: NORMAL

## 2023-06-26 ENCOUNTER — NON-APPOINTMENT (OUTPATIENT)
Age: 68
End: 2023-06-26

## 2023-11-07 ENCOUNTER — NON-APPOINTMENT (OUTPATIENT)
Age: 68
End: 2023-11-07

## 2023-11-21 ENCOUNTER — APPOINTMENT (OUTPATIENT)
Dept: OBGYN | Facility: CLINIC | Age: 68
End: 2023-11-21
Payer: MEDICARE

## 2023-11-21 VITALS
HEART RATE: 81 BPM | DIASTOLIC BLOOD PRESSURE: 81 MMHG | WEIGHT: 133 LBS | SYSTOLIC BLOOD PRESSURE: 159 MMHG | HEIGHT: 62 IN | BODY MASS INDEX: 24.48 KG/M2

## 2023-11-21 DIAGNOSIS — I10 ESSENTIAL (PRIMARY) HYPERTENSION: ICD-10-CM

## 2023-11-21 DIAGNOSIS — E11.9 TYPE 2 DIABETES MELLITUS W/OUT COMPLICATIONS: ICD-10-CM

## 2023-11-21 DIAGNOSIS — Z01.419 ENCOUNTER FOR GYNECOLOGICAL EXAMINATION (GENERAL) (ROUTINE) W/OUT ABNORMAL FINDINGS: ICD-10-CM

## 2023-11-21 DIAGNOSIS — Z78.0 ASYMPTOMATIC MENOPAUSAL STATE: ICD-10-CM

## 2023-11-21 PROCEDURE — 99397 PER PM REEVAL EST PAT 65+ YR: CPT

## 2023-11-21 RX ORDER — NIACIN 100 MG
TABLET ORAL
Refills: 0 | Status: ACTIVE | COMMUNITY

## 2023-11-21 RX ORDER — CINNAMON BARK 500 MG
CAPSULE ORAL
Refills: 0 | Status: ACTIVE | COMMUNITY

## 2023-11-21 RX ORDER — ZINC SULFATE 50(220)MG
CAPSULE ORAL
Refills: 0 | Status: ACTIVE | COMMUNITY

## 2023-11-21 RX ORDER — MULTIVIT-MIN/IRON/FOLIC ACID/K 18-600-40
CAPSULE ORAL
Refills: 0 | Status: ACTIVE | COMMUNITY

## 2023-11-21 RX ORDER — ASCORBIC ACID 125 MG
TABLET,CHEWABLE ORAL
Refills: 0 | Status: ACTIVE | COMMUNITY

## 2023-11-21 RX ORDER — GLUC/MSM/COLGN2/HYAL/ANTIARTH3 375-375-20
TABLET ORAL
Refills: 0 | Status: ACTIVE | COMMUNITY

## 2023-11-21 RX ORDER — PSYLLIUM HUSK 0.4 G
CAPSULE ORAL
Refills: 0 | Status: ACTIVE | COMMUNITY

## 2023-11-24 LAB — HPV HIGH+LOW RISK DNA PNL CVX: NOT DETECTED

## 2023-11-27 LAB — CYTOLOGY CVX/VAG DOC THIN PREP: ABNORMAL

## 2023-12-28 ENCOUNTER — NON-APPOINTMENT (OUTPATIENT)
Age: 68
End: 2023-12-28

## 2023-12-29 ENCOUNTER — NON-APPOINTMENT (OUTPATIENT)
Age: 68
End: 2023-12-29

## 2024-06-09 ENCOUNTER — NON-APPOINTMENT (OUTPATIENT)
Age: 69
End: 2024-06-09

## 2024-10-15 ENCOUNTER — NON-APPOINTMENT (OUTPATIENT)
Age: 69
End: 2024-10-15

## 2024-10-21 ENCOUNTER — APPOINTMENT (OUTPATIENT)
Dept: PULMONOLOGY | Facility: CLINIC | Age: 69
End: 2024-10-21
Payer: MEDICARE

## 2024-10-21 ENCOUNTER — APPOINTMENT (OUTPATIENT)
Dept: PULMONOLOGY | Facility: CLINIC | Age: 69
End: 2024-10-21

## 2024-10-21 VITALS
HEART RATE: 81 BPM | SYSTOLIC BLOOD PRESSURE: 121 MMHG | HEIGHT: 62 IN | DIASTOLIC BLOOD PRESSURE: 78 MMHG | WEIGHT: 131 LBS | OXYGEN SATURATION: 97 % | BODY MASS INDEX: 24.11 KG/M2 | TEMPERATURE: 97.6 F

## 2024-10-21 DIAGNOSIS — I10 ESSENTIAL (PRIMARY) HYPERTENSION: ICD-10-CM

## 2024-10-21 DIAGNOSIS — Z87.891 PERSONAL HISTORY OF NICOTINE DEPENDENCE: ICD-10-CM

## 2024-10-21 DIAGNOSIS — R05.9 COUGH, UNSPECIFIED: ICD-10-CM

## 2024-10-21 DIAGNOSIS — Z72.0 TOBACCO USE: ICD-10-CM

## 2024-10-21 LAB — HEMOGLOBIN: 10.8

## 2024-10-21 PROCEDURE — 99204 OFFICE O/P NEW MOD 45 MIN: CPT | Mod: 25

## 2024-10-21 PROCEDURE — 85018 HEMOGLOBIN: CPT | Mod: QW

## 2024-10-21 PROCEDURE — 94727 GAS DIL/WSHOT DETER LNG VOL: CPT

## 2024-10-21 PROCEDURE — 94010 BREATHING CAPACITY TEST: CPT

## 2024-10-21 PROCEDURE — 94729 DIFFUSING CAPACITY: CPT

## 2024-10-21 PROCEDURE — ZZZZZ: CPT

## 2024-10-21 PROCEDURE — 95012 NITRIC OXIDE EXP GAS DETER: CPT

## 2024-10-22 ENCOUNTER — APPOINTMENT (OUTPATIENT)
Dept: ORTHOPEDIC SURGERY | Facility: CLINIC | Age: 69
End: 2024-10-22
Payer: MEDICARE

## 2024-10-22 ENCOUNTER — NON-APPOINTMENT (OUTPATIENT)
Age: 69
End: 2024-10-22

## 2024-10-22 VITALS
DIASTOLIC BLOOD PRESSURE: 75 MMHG | HEART RATE: 84 BPM | BODY MASS INDEX: 24.23 KG/M2 | WEIGHT: 130 LBS | HEIGHT: 61.5 IN | SYSTOLIC BLOOD PRESSURE: 124 MMHG

## 2024-10-22 DIAGNOSIS — M77.8 OTHER ENTHESOPATHIES, NOT ELSEWHERE CLASSIFIED: ICD-10-CM

## 2024-10-22 DIAGNOSIS — M25.511 PAIN IN RIGHT SHOULDER: ICD-10-CM

## 2024-10-22 PROCEDURE — 73030 X-RAY EXAM OF SHOULDER: CPT | Mod: RT

## 2024-10-22 PROCEDURE — 99204 OFFICE O/P NEW MOD 45 MIN: CPT

## 2024-10-22 RX ORDER — MELOXICAM 15 MG/1
15 TABLET ORAL DAILY
Qty: 1 | Refills: 0 | Status: ACTIVE | COMMUNITY
Start: 2024-10-22 | End: 1900-01-01

## 2024-11-14 ENCOUNTER — APPOINTMENT (OUTPATIENT)
Dept: ORTHOPEDIC SURGERY | Facility: CLINIC | Age: 69
End: 2024-11-14
Payer: MEDICARE

## 2024-11-14 VITALS
DIASTOLIC BLOOD PRESSURE: 84 MMHG | SYSTOLIC BLOOD PRESSURE: 136 MMHG | HEIGHT: 61.5 IN | WEIGHT: 135 LBS | BODY MASS INDEX: 25.16 KG/M2 | HEART RATE: 82 BPM

## 2024-11-14 DIAGNOSIS — M16.0 BILATERAL PRIMARY OSTEOARTHRITIS OF HIP: ICD-10-CM

## 2024-11-14 DIAGNOSIS — M77.8 OTHER ENTHESOPATHIES, NOT ELSEWHERE CLASSIFIED: ICD-10-CM

## 2024-11-14 PROCEDURE — 73502 X-RAY EXAM HIP UNI 2-3 VIEWS: CPT

## 2024-11-14 PROCEDURE — 99214 OFFICE O/P EST MOD 30 MIN: CPT

## 2024-11-18 ENCOUNTER — EMERGENCY (EMERGENCY)
Facility: HOSPITAL | Age: 69
LOS: 0 days | Discharge: HOME | End: 2024-11-19
Attending: STUDENT IN AN ORGANIZED HEALTH CARE EDUCATION/TRAINING PROGRAM | Admitting: HOSPITALIST
Payer: MEDICARE

## 2024-11-18 VITALS
TEMPERATURE: 98 F | SYSTOLIC BLOOD PRESSURE: 184 MMHG | HEART RATE: 75 BPM | WEIGHT: 154.98 LBS | RESPIRATION RATE: 19 BRPM | OXYGEN SATURATION: 100 % | HEIGHT: 63 IN | DIASTOLIC BLOOD PRESSURE: 88 MMHG

## 2024-11-18 DIAGNOSIS — E11.649 TYPE 2 DIABETES MELLITUS WITH HYPOGLYCEMIA WITHOUT COMA: ICD-10-CM

## 2024-11-18 DIAGNOSIS — E05.90 THYROTOXICOSIS, UNSPECIFIED WITHOUT THYROTOXIC CRISIS OR STORM: ICD-10-CM

## 2024-11-18 DIAGNOSIS — R41.82 ALTERED MENTAL STATUS, UNSPECIFIED: ICD-10-CM

## 2024-11-18 DIAGNOSIS — Z98.51 TUBAL LIGATION STATUS: Chronic | ICD-10-CM

## 2024-11-18 DIAGNOSIS — I10 ESSENTIAL (PRIMARY) HYPERTENSION: ICD-10-CM

## 2024-11-18 DIAGNOSIS — Z98.890 OTHER SPECIFIED POSTPROCEDURAL STATES: Chronic | ICD-10-CM

## 2024-11-18 DIAGNOSIS — E78.5 HYPERLIPIDEMIA, UNSPECIFIED: ICD-10-CM

## 2024-11-18 LAB
ALBUMIN SERPL ELPH-MCNC: 3.3 G/DL — SIGNIFICANT CHANGE UP (ref 3.3–5)
ALP SERPL-CCNC: 78 U/L — SIGNIFICANT CHANGE UP (ref 40–120)
ALT FLD-CCNC: 10 U/L — LOW (ref 12–78)
ANION GAP SERPL CALC-SCNC: 6 MMOL/L — SIGNIFICANT CHANGE UP (ref 5–17)
ANISOCYTOSIS BLD QL: SLIGHT — SIGNIFICANT CHANGE UP
APTT BLD: 34.2 SEC — SIGNIFICANT CHANGE UP (ref 24.5–35.6)
AST SERPL-CCNC: 9 U/L — LOW (ref 15–37)
BASOPHILS # BLD AUTO: 0.09 K/UL — SIGNIFICANT CHANGE UP (ref 0–0.2)
BASOPHILS NFR BLD AUTO: 1.1 % — SIGNIFICANT CHANGE UP (ref 0–2)
BILIRUB SERPL-MCNC: 0.4 MG/DL — SIGNIFICANT CHANGE UP (ref 0.2–1.2)
BUN SERPL-MCNC: 28 MG/DL — HIGH (ref 7–23)
CALCIUM SERPL-MCNC: 8.9 MG/DL — SIGNIFICANT CHANGE UP (ref 8.5–10.1)
CHLORIDE SERPL-SCNC: 110 MMOL/L — HIGH (ref 96–108)
CO2 SERPL-SCNC: 23 MMOL/L — SIGNIFICANT CHANGE UP (ref 22–31)
CREAT SERPL-MCNC: 1.13 MG/DL — SIGNIFICANT CHANGE UP (ref 0.5–1.3)
EGFR: 52 ML/MIN/1.73M2 — LOW
EOSINOPHIL # BLD AUTO: 0.24 K/UL — SIGNIFICANT CHANGE UP (ref 0–0.5)
EOSINOPHIL NFR BLD AUTO: 2.9 % — SIGNIFICANT CHANGE UP (ref 0–6)
GLUCOSE BLDC GLUCOMTR-MCNC: 124 MG/DL — HIGH (ref 70–99)
GLUCOSE BLDC GLUCOMTR-MCNC: 145 MG/DL — HIGH (ref 70–99)
GLUCOSE SERPL-MCNC: 101 MG/DL — HIGH (ref 70–99)
HCT VFR BLD CALC: 33.8 % — LOW (ref 34.5–45)
HGB BLD-MCNC: 10.5 G/DL — LOW (ref 11.5–15.5)
HYPOCHROMIA BLD QL: SLIGHT — SIGNIFICANT CHANGE UP
IMM GRANULOCYTES NFR BLD AUTO: 0.4 % — SIGNIFICANT CHANGE UP (ref 0–0.9)
INR BLD: 0.99 RATIO — SIGNIFICANT CHANGE UP (ref 0.85–1.16)
LYMPHOCYTES # BLD AUTO: 1.99 K/UL — SIGNIFICANT CHANGE UP (ref 1–3.3)
LYMPHOCYTES # BLD AUTO: 24.4 % — SIGNIFICANT CHANGE UP (ref 13–44)
MANUAL SMEAR VERIFICATION: SIGNIFICANT CHANGE UP
MCHC RBC-ENTMCNC: 21.9 PG — LOW (ref 27–34)
MCHC RBC-ENTMCNC: 31.1 G/DL — LOW (ref 32–36)
MCV RBC AUTO: 70.4 FL — LOW (ref 80–100)
MICROCYTES BLD QL: SLIGHT — SIGNIFICANT CHANGE UP
MONOCYTES # BLD AUTO: 0.57 K/UL — SIGNIFICANT CHANGE UP (ref 0–0.9)
MONOCYTES NFR BLD AUTO: 7 % — SIGNIFICANT CHANGE UP (ref 2–14)
NEUTROPHILS # BLD AUTO: 5.25 K/UL — SIGNIFICANT CHANGE UP (ref 1.8–7.4)
NEUTROPHILS NFR BLD AUTO: 64.2 % — SIGNIFICANT CHANGE UP (ref 43–77)
NRBC # BLD: 0 /100 WBCS — SIGNIFICANT CHANGE UP (ref 0–0)
OVALOCYTES BLD QL SMEAR: SLIGHT — SIGNIFICANT CHANGE UP
PLAT MORPH BLD: NORMAL — SIGNIFICANT CHANGE UP
PLATELET # BLD AUTO: 464 K/UL — HIGH (ref 150–400)
POIKILOCYTOSIS BLD QL AUTO: SLIGHT — SIGNIFICANT CHANGE UP
POTASSIUM SERPL-MCNC: 4 MMOL/L — SIGNIFICANT CHANGE UP (ref 3.5–5.3)
POTASSIUM SERPL-SCNC: 4 MMOL/L — SIGNIFICANT CHANGE UP (ref 3.5–5.3)
PROT SERPL-MCNC: 7.4 GM/DL — SIGNIFICANT CHANGE UP (ref 6–8.3)
PROTHROM AB SERPL-ACNC: 11.2 SEC — SIGNIFICANT CHANGE UP (ref 9.9–13.4)
RBC # BLD: 4.8 M/UL — SIGNIFICANT CHANGE UP (ref 3.8–5.2)
RBC # FLD: 17.8 % — HIGH (ref 10.3–14.5)
RBC BLD AUTO: SIGNIFICANT CHANGE UP
SODIUM SERPL-SCNC: 139 MMOL/L — SIGNIFICANT CHANGE UP (ref 135–145)
TROPONIN I, HIGH SENSITIVITY RESULT: 13.6 NG/L — SIGNIFICANT CHANGE UP
WBC # BLD: 8.17 K/UL — SIGNIFICANT CHANGE UP (ref 3.8–10.5)
WBC # FLD AUTO: 8.17 K/UL — SIGNIFICANT CHANGE UP (ref 3.8–10.5)

## 2024-11-18 PROCEDURE — 99222 1ST HOSP IP/OBS MODERATE 55: CPT

## 2024-11-18 PROCEDURE — 70450 CT HEAD/BRAIN W/O DYE: CPT | Mod: 26,MC,XU

## 2024-11-18 PROCEDURE — 70551 MRI BRAIN STEM W/O DYE: CPT | Mod: 26,MC

## 2024-11-18 PROCEDURE — 93010 ELECTROCARDIOGRAM REPORT: CPT

## 2024-11-18 PROCEDURE — 70498 CT ANGIOGRAPHY NECK: CPT | Mod: 26,MC

## 2024-11-18 PROCEDURE — 70496 CT ANGIOGRAPHY HEAD: CPT | Mod: 26,MC

## 2024-11-18 PROCEDURE — 0042T: CPT | Mod: MC

## 2024-11-18 PROCEDURE — 99285 EMERGENCY DEPT VISIT HI MDM: CPT

## 2024-11-18 RX ORDER — MELATONIN 5 MG
3 TABLET ORAL AT BEDTIME
Refills: 0 | Status: DISCONTINUED | OUTPATIENT
Start: 2024-11-18 | End: 2024-11-19

## 2024-11-18 RX ORDER — INSULIN GLARGINE,HUM.REC.ANLOG 100/ML
10 VIAL (ML) SUBCUTANEOUS AT BEDTIME
Refills: 0 | Status: DISCONTINUED | OUTPATIENT
Start: 2024-11-18 | End: 2024-11-19

## 2024-11-18 RX ORDER — TIMOLOL MALEATE 0.5 %
1 DROPS OPHTHALMIC (EYE)
Refills: 0 | Status: DISCONTINUED | OUTPATIENT
Start: 2024-11-18 | End: 2024-11-18

## 2024-11-18 RX ORDER — EMPAGLIFLOZIN 25 MG/1
1 TABLET, FILM COATED ORAL
Refills: 0 | DISCHARGE

## 2024-11-18 RX ORDER — GLUCAGON INJECTION, SOLUTION 1 MG/.2ML
1 INJECTION, SOLUTION SUBCUTANEOUS ONCE
Refills: 0 | Status: DISCONTINUED | OUTPATIENT
Start: 2024-11-18 | End: 2024-11-19

## 2024-11-18 RX ORDER — DORZOLAMIDE HYDROCHLORIDE 20 MG/ML
1 SOLUTION OPHTHALMIC
Refills: 0 | Status: DISCONTINUED | OUTPATIENT
Start: 2024-11-18 | End: 2024-11-19

## 2024-11-18 RX ORDER — LATANOPROST 0.005 %
1 DROPS OPHTHALMIC (EYE) AT BEDTIME
Refills: 0 | Status: DISCONTINUED | OUTPATIENT
Start: 2024-11-18 | End: 2024-11-19

## 2024-11-18 RX ORDER — CLOPIDOGREL 75 MG/1
75 TABLET ORAL ONCE
Refills: 0 | Status: COMPLETED | OUTPATIENT
Start: 2024-11-18 | End: 2024-11-18

## 2024-11-18 RX ORDER — TIRZEPATIDE 5 MG/.5ML
0 INJECTION, SOLUTION SUBCUTANEOUS
Refills: 0 | DISCHARGE

## 2024-11-18 RX ORDER — INSULIN LISPRO 100/ML
VIAL (ML) SUBCUTANEOUS
Refills: 0 | Status: DISCONTINUED | OUTPATIENT
Start: 2024-11-18 | End: 2024-11-19

## 2024-11-18 RX ORDER — CLOPIDOGREL 75 MG/1
75 TABLET ORAL DAILY
Refills: 0 | Status: DISCONTINUED | OUTPATIENT
Start: 2024-11-19 | End: 2024-11-19

## 2024-11-18 RX ORDER — INSULIN LISPRO 100/ML
VIAL (ML) SUBCUTANEOUS AT BEDTIME
Refills: 0 | Status: DISCONTINUED | OUTPATIENT
Start: 2024-11-18 | End: 2024-11-19

## 2024-11-18 RX ORDER — HYDRALAZINE HYDROCHLORIDE 50 MG/1
5 TABLET, FILM COATED ORAL EVERY 6 HOURS
Refills: 0 | Status: DISCONTINUED | OUTPATIENT
Start: 2024-11-18 | End: 2024-11-19

## 2024-11-18 RX ORDER — ENOXAPARIN SODIUM 40MG/0.4ML
40 SYRINGE (ML) SUBCUTANEOUS EVERY 24 HOURS
Refills: 0 | Status: DISCONTINUED | OUTPATIENT
Start: 2024-11-18 | End: 2024-11-19

## 2024-11-18 RX ORDER — ACETAMINOPHEN 500 MG
650 TABLET ORAL EVERY 6 HOURS
Refills: 0 | Status: DISCONTINUED | OUTPATIENT
Start: 2024-11-18 | End: 2024-11-19

## 2024-11-18 RX ORDER — ASPIRIN/MAG CARB/ALUMINUM AMIN 325 MG
81 TABLET ORAL DAILY
Refills: 0 | Status: DISCONTINUED | OUTPATIENT
Start: 2024-11-18 | End: 2024-11-19

## 2024-11-18 RX ORDER — ROSUVASTATIN CALCIUM 10 MG
5 TABLET ORAL AT BEDTIME
Refills: 0 | Status: DISCONTINUED | OUTPATIENT
Start: 2024-11-18 | End: 2024-11-19

## 2024-11-18 RX ORDER — DORZOLAMIDE HYDROCHLORIDE 20 MG/ML
1 SOLUTION OPHTHALMIC
Refills: 0 | DISCHARGE

## 2024-11-18 RX ADMIN — Medication 81 MILLIGRAM(S): at 10:32

## 2024-11-18 RX ADMIN — DORZOLAMIDE HYDROCHLORIDE 1 DROP(S): 20 SOLUTION OPHTHALMIC at 20:21

## 2024-11-18 RX ADMIN — Medication 40 MILLIGRAM(S): at 22:52

## 2024-11-18 RX ADMIN — Medication 5 MILLIGRAM(S): at 21:49

## 2024-11-18 RX ADMIN — CLOPIDOGREL 75 MILLIGRAM(S): 75 TABLET ORAL at 10:32

## 2024-11-18 RX ADMIN — Medication 10 UNIT(S): at 21:46

## 2024-11-18 NOTE — ED PROVIDER NOTE - PROGRESS NOTE DETAILS
Kwok DO: pt now able to state full name and date, normal speech, repeat nihss 0, no indication for TNK, will cont to monitor, anticipated admission for TIA work up pending CT r Kwok DO: repeat exam normal, nihss 0, normal speech, no aphasia, aox3, no drift, normal sensation/ strength throughout, normal writing, stable for admission, d/w Dr Martinez of neurology, agrees asa/plavix and TIA observation, d/w hospitalist Dr Villarreal, family updated

## 2024-11-18 NOTE — ED ADULT NURSE NOTE - OBJECTIVE STATEMENT
patient alert and oriented x0. patient 69 year old female BIBEMS for AMS. As per family, patient was last seen normal last night around 9PM and this morning patient was confused when she woke up. As per family, patient was unable to speak and her speech was garbled but that this resolved by the time EMS came. Patient is now speaking clearly. Patient placed on cardiac monitor. Dr. Kwok at bedside to evaluate. patient alert and oriented x1. patient 69 year old female BIBEMS for AMS. As per family, patient was last seen normal last night around 9PM and this morning patient was confused when she woke up. As per family, patient was unable to speak and her speech was garbled but that this resolved by the time EMS came. Patient is now speaking clearly. Patient placed on cardiac monitor. Dr. Kwok at bedside to evaluate.

## 2024-11-18 NOTE — ED ADULT NURSE REASSESSMENT NOTE - NS ED NURSE REASSESS COMMENT FT1
Pt resting comfortably at this time. No s/s of pain noted. VSS as documented, optimal comfort provided. VSS as documented, pt pending bed for admission. Plan of care ongoing.

## 2024-11-18 NOTE — H&P ADULT - PROBLEM SELECTOR PLAN 2
patient has freestyle libria. From phone monitor, hypoglycemic in early 3AM or so  Currently on Lantus 18 unit hs, metformin 1g bid, Jardiance 25mg daily  and Mounjaro  Decrease Lantus to 10 unit hs  ISS, hypoglycemia protocol, A1c  Monitor glucose and titrate insulin regimens  Continue outpatient Endo follow up, patient has upcoming follow up appointment

## 2024-11-18 NOTE — H&P ADULT - PROBLEM/PLAN-3
75  year old male    h/o  CAD s/p stent , asa.      A-fib/ PPM, , hypothyroid, and total left knee replacement    prior  PPM  interrogation  with  W[facility]/   s/p  brief   bursts  of  afib/ , on prior visit   Ct chest, retrosternal hematoma.  mod left pl effusion    was  on bumex/  aldactone        *    admitted   for  worsening   pedal  edema          component  of  cellulitis   and  from  c/c  diastolic  chf,/  h/o  l/edema  of L  leg         pt  admits to  being  non complaint with  meds        *   h/o    c/c AFIB,   has  PPM         on eliquis     *    Anemia, , hb stable, had  been seen by  gi  eval dr joann sanchez in past     *    CAD,     cath, with 2 vessel disease,  s/p  CABG and  MVR  surg d r marni   *      h/o  Afib on    eliquis          c/c leg redness/  4 +  edema, on keflex, has  improved    *     echo,  on 7/2022,  ef  35/ new/ severe  TR          cath,   was non  obstructive    *    on synthroid          on asa/  torpol/  eliquis          crt  noted,   from  lasix// aldactone.  leg  swelling  is  lessening   *   MSSA  bacterinia           was  on iv ancef.  ,  rpt bcx    are  negative          explant pf  ppm  and  Micra  placement on  3/30/23. dr sunitha rocha   *    Echo,  ef  25,  mod  AI.  severe  TR        PICC  line  and  extended  course  of  ab.        BEULAH,  /   ?  AIN,  hence   ab was  switched   to iv vanco,  per  ID        follow  vanco  level  and dose  accordingly         Afib.was   on iv heparin        crt   was  9,9, now  on HD /  bumex         hematuria ,  hence  iv heparin held/   s/p  renal bx on 4/ 14.  awiating path         card /  dr mikayla dick         rad< from: TTE with Doppler (w/Cont) (07.05.22 @ 09:42) >  Conclusions:  Endocardial visualization enhanced with intravenous  injection of Ultrasonic Enhancing Agent (Definity).  Severe left ventricular enlargement.  Estimated ejection fraction 35%. Diffuse hypokinesis, with  inferior akinesis.  Bioprosthetic mitral valve with normal function.  Right ventricular enlargement with decreased right  ventricular systolic function.  A device wire is noted in the right heart.  ------------------------------------------------------------------------  Confirmed on  7/5/2022 - 12:16:10 by Kristian    < end of copied text >                                  DISPLAY PLAN FREE TEXT

## 2024-11-18 NOTE — ED ADULT TRIAGE NOTE - CHIEF COMPLAINT QUOTE
Refill requested   
Patient BIBA presents to ED with AMS status started this morning, denies any recent falls ambulatory at scene. No sudden loss of balance, blurred vision or weakness on one side. Patient able to follow some commands but unable to recall surname or . Patient accompanied by family, states alerted mental status started this morning. BGL at the triage 96.  hx HTN, DM

## 2024-11-18 NOTE — H&P ADULT - PROBLEM SELECTOR PLAN 1
present with transient episode of confusion and speech difficulty  CT head  ( I personally review) with no acute intracranial pathology. CTA with no large vessel occlusion. Noted markedly enlarged multinodular goiter  From patient's phone monitor, patient had episode of hypoglycemia in early 3AM or so  metabolic encephalopathy due to hypoglycemia vs TIA  permissive HTN  24 hours from symptoms onset. neuro check  Check MRI brain, telemetry monitoring  Lipid panel, A1c  continue aspirin, statin  ED consulted neurology

## 2024-11-18 NOTE — CONSULT NOTE ADULT - ASSESSMENT
TIA vs. stroke  HTN  HLD  DM2    - aspirin, Plavix, and statin for secondary stroke prevention. May discontinue Plavix in 21 days.  - MRI brain pending  - Echo, FLP, TSH, and HgbA1c  - PT eval  - neuro stable    Thank you for the consult.  TIA   HTN  HLD  DM2    - aspirin, Plavix, and statin for secondary stroke prevention. May discontinue Plavix in 21 days.  - Echo, FLP, TSH, and HgbA1c  - PT eval  - neuro stable  - please call with questions    Thank you for the consult.

## 2024-11-18 NOTE — ED PROVIDER NOTE - CLINICAL SUMMARY MEDICAL DECISION MAKING FREE TEXT BOX
69F pmhx HTN, HLD, DM who presents for evaluation of episode of AMS/ confusion and slurred speech approx 730am - witnessed by family daughter Lucila at bedside and  Reji. Pt describes waking up this morning, reading, then suddenly 'everything was gone', unable to fully describe what she was feeling but reports right arm felt abnormal. Daughter states that pts speech sounded garbled. Pt last seen completely normal 1030pm. This morning she appeared normal but they had not yet spoken to her so family is not sure if she had speech changes this morning   On assessment pt is able to speak clearly and no focal deficit but does not answer LOC questions - states 'I don't know, I don't remember' but also states she does not remember daughters name but then calls daughter by her name. Normal reading and recognition of objects  Low NIHSS score 1, unclear time of onset of symptoms if overnight or this morning

## 2024-11-18 NOTE — ED ADULT TRIAGE NOTE - STATUS:
March 25, 2024       Martin Valencia MD  [address] [place] [id number]  Via Fax: [phone]      Patient: Flo Wang   YOB: 1967   Date of Visit: 3/25/2024       Dear Dr. Valencia:    Thank you for referring Flo Wang to me for evaluation. Below are my notes for this visit with him.    If you have questions, please do not hesitate to call me. I look forward to following your patient along with you.      Sincerely,        Zoey Overton MD        CC: No Recipients  Zoey Overton MD  3/25/2024  9:37 AM  Signed                                   Cardiology Office Visit    Chief complaint  Chief Complaint   Patient presents with   • Office Visit     PCP: Dr. Valencia   • Follow-up       HPI:   The patient is 56 year old male presenting for a follow up visit.    Today, the patient is doing well. He reports experiencing palpitations which occur once a month. His Smart Watch recorded his ECG during the time he felt palpitations which showed premature PVCs in a trigeminal pattern. He states his last episode was March 11th.  He reports having the symptoms about once in 3 to 4 weeks which he was also able to confirm by looking at his recordings on his smart watch.  However he states they are not too bothersome for him to change his therapeutic plan at this time.    He also reports that he wore his smart watch for almost 2 months continuously when he got it initially and he did not report any nocturnal desaturations to suggest that he has sleep apnea.    Patient reports being active and is without symptoms of chest pain, shortness of breath, arm or jaw pain during exertion. He walks about 6,000 steps a day. Patient is a former smoker. He smoked 2 ppd for 14 years but has quit 25 years ago.     Constitutional: No fever or chills  Respiratory: No hemoptysis  GI: No reported melena or hematochezia  : No hematuria reported  Skin: No rash  Heme: No reported blood dyscrasias.  Remainder  of review of systems negative or as detailed above.        Past Medical History:   Diagnosis Date   • Essential (primary) hypertension    • Hashimoto's disease      Past Surgical History:   Procedure Laterality Date   • Back surgery N/A 2019     Family History   Problem Relation Age of Onset   • Stroke Mother    • Myocardial Infarction Father    • Pulmonary embolism Brother    • Coronary Artery Disease Brother      Social History     Socioeconomic History   • Marital status: /Civil Union     Spouse name: Not on file   • Number of children: Not on file   • Years of education: Not on file   • Highest education level: Not on file   Occupational History   • Not on file   Tobacco Use   • Smoking status: Former     Current packs/day: 0.00     Types: Cigarettes     Quit date:      Years since quittin.2   • Smokeless tobacco: Never   Vaping Use   • Vaping Use: never used   Substance and Sexual Activity   • Alcohol use: Not Currently     Comment: rarely   • Drug use: Never   • Sexual activity: Not on file   Other Topics Concern   • Not on file   Social History Narrative   • Not on file     Social Determinants of Health     Financial Resource Strain: Not on file   Food Insecurity: Not on file   Transportation Needs: Not on file   Physical Activity: Not on file   Stress: Not on file   Social Connections: Not on file   Interpersonal Safety: Not on file     Current Medications    AMLODIPINE (NORVASC) 2.5 MG TABLET    Take 1 tablet by mouth daily.    ASPIRIN 81 MG EC TABLET    Take 81 mg by mouth daily.    ATORVASTATIN (LIPITOR) 40 MG TABLET    Take 1 tablet by mouth daily.    LEVOTHYROXINE 125 MCG TABLET    daily.    LOSARTAN (COZAAR) 100 MG TABLET    daily.    METOPROLOL SUCCINATE (TOPROL XL) 25 MG 24 HR TABLET    Take 1 tablet by mouth daily.    MULTIPLE VITAMINS-MINERALS (VITAMIN - THERAPEUTIC MULTIVITAMINS W/MINERALS) TABLET    daily.     ALLERGIES:  No Known Allergies    PHYSICAL EXAM   Visit Vitals  BP  120/78 (BP Location: LUE - Left upper extremity, Patient Position: Sitting)   Pulse 88   Ht 6' 1.5\" (1.867 m)   Wt 99.9 kg (220 lb 2.1 oz)   SpO2 97%   BMI 28.65 kg/m²       GENERAL: No apparent distress  HEENT: PERRL, EOMI. Normocephalic.  Neck:  Supple neck.   Oral mucosa : Pink and moist.    Endocrine: There is no goiter.  CVS: Nonpalpable PMI.  Regular rate and rhythm.  Normal first and second heart tones.   Lung fields: Clear to auscultation bilaterally.   Abdomen: Soft. Nontender, nondistended.    Lower extremity: No cyanosis, clubbing or edema.   Peripheral vascular: Both lower extremities are warm and well perfused.    Neuro: Awake and alert.  Nonfocal examination.  Psych: Appropriate mood and affect  Integumentary: Warm and Dry      Procedures:  No results found for this or any previous visit.    CT HEART CALCIUM SCORING    Result Date: 3/20/2023  CONTACT YOUR PHYSICIAN TO REVIEW YOUR FINDINGS CT OF THE HEART WITHOUT CONTRAST; CORONARY CALCIUM SCORING PROCEDURE: A non-contrast enhanced, prospectively gated scan was performed for detection of coronary calcium.  The threshold utilized for calcium detection was 130 Hounsfield units; the Agatston scoring method was performed for coronary calcium scoring. RESULTS: Coronary calcium score was 1916. The calcium score places the patient in 90th to 100th percentile for age and gender-matched subjects. Calcium was present in . Left main artery:0 Left anterior descending artery:555 Left circumflex artery:79 Right coronary artery:1116 Posterior descending artery:0     1.  Coronary calcium score 1916. 2.  This score is in the 90th to 100th percentile for age and gender-matched subjects. . This study is strictly limited to the assessment of the coronary arteries for calcification.  If there is concern for any other pathology, a dedicated further imaging study is advised.. Electronically Signed by: GARFIELD GUTIÉRREZ Signed on: 3/20/2023 4:32 PM Workstation ID: [id number]    CT  SOFT TISSUE OVERREAD    Result Date: 3/19/2023  EXAM: CT SOFT TISSUE OVERREAD CLINICAL INDICATION: Cardiac risk screening.  Family history of early CAD [Z82.49 (ICD-10-CM COMPARISON:  None. TECHNIQUE: Limited field-of-view CT of the chest was performed for coronary vessel evaluation without IV contrast. FINDINGS: Lungs/Central Airways: Focal opacity along the pleura of the right middle lobe may represent atelectasis or a 6 mm pulmonary nodule.  There is a 4 mm left lower lobe nodule (3/4). Pleural Spaces:  Visualized pleural spaces are clear. Heart: The heart is normal in size. Mediastinum/Kelyl:  There is a small hiatal hernia. Upper abdomen/chest wall:  Visualized upper abdominal organs are unremarkable. There is mild multilevel thoracic degenerative disc disease. EXTRAPERICARDIAL CT INTERPRETATION: Protocol of this cardiac CT including all imaging parameters, the safety issues, intravenous contrast administration and dosage were under the complete control and sole responsibility of the participating cardiologist. I, as the participating radiologist, have reviewed only the extrapericardial structures. No measurements or functional processing/calculations were performed by the radiologist. The interpretation of the structures within the pericardium is the sole responsibility of the participating cardiologist. Reviewed sequences are optimized for cardiac diagnosis and not necessarily the extrapericardial visualized soft tissues and osseous structures. Small or subtle abnormalities may not be detected on this exam.      Small bilateral lung nodules measure up to 6 mm.  Per the Fleischner Society Guidelines (Radiology 2005; 237:395-400), for incidental nodules greater than 4 mm to 6 mm, in patients at low risk for lung cancer, a 12 month follow-up non-contrast chest CT is suggested. If there is no change, no further follow-up is necessary. For patients at higher risk, such as smokers, a follow-up non-contrast chest CT  in 6-12 months is suggested. If stable, this should be followed up with a non-contrast chest CT at 18-24 months. Other detailed cardiac findings including coronary calcium score will be reported separately by cardiology. Electronically Signed by: MEY BELLAMY M.D. Signed on: 3/19/2023 7:21 PM Workstation ID: [id number]    Lexiscan stress test 4/12/2023:  Indications:   Elevated calcium score on CT.     ------------------------------------------------------------------------------  Study Conclusions     Summary:     1. Myocardial perfusion imaging: Left ventricular size is normal. There is no     transient ischemic dilation of the left ventricle during stress. Myocardial     perfusion study is normal.  2. Gated SPECT: Normal: no left ventricular regional motion abnormality.  3. Rest: The calculated EF is 60%.  4. Stress ECG conclusions: The stress ECG is negative for ischemia.  5. Baseline ECG: Normal sinus rhythm.      Labs:  Sodium (mmol/L)   Date Value   11/06/2023 140   07/13/2023 141     Potassium (mmol/L)   Date Value   11/06/2023 4.1   07/13/2023 4.2     Chloride (mmol/L)   Date Value   11/06/2023 108   07/13/2023 105     Carbon Dioxide (mmol/L)   Date Value   11/06/2023 28   07/13/2023 30     BUN (mg/dL)   Date Value   11/06/2023 18   07/13/2023 13     Creatinine (mg/dL)   Date Value   11/06/2023 0.84   07/13/2023 0.86     Glucose (mg/dL)   Date Value   11/06/2023 103 (H)   07/13/2023 88       Hemoglobin A1C (%)   Date Value   01/09/2023 5.8 (H)   07/05/2022 5.8 (H)       Cholesterol (mg/dL)   Date Value   11/06/2023 98   12/19/2022 158     HDL (mg/dL)   Date Value   11/06/2023 39 (L)   12/19/2022 30 (L)     Triglycerides (mg/dL)   Date Value   11/06/2023 62   12/19/2022 235 (H)     LDL (mg/dL)   Date Value   11/06/2023 47   12/19/2022 81       TSH (mcUnits/mL)   Date Value   11/06/2023 3.180       No results found for: \"INR\"    WBC (K/mcL)   Date Value   11/06/2023 5.6     RBC (mil/mcL)   Date Value    11/06/2023 4.47 (L)     HCT (%)   Date Value   11/06/2023 42.3     HGB (g/dL)   Date Value   11/06/2023 13.7     PLT (K/mcL)   Date Value   11/06/2023 181       IMPRESSION/PLAN:  No diagnosis found.      No orders of the defined types were placed in this encounter.    CAD  Elevated coronary artery calcium score  -Calcium score 1916  -LM 0; ; RCA 1116; Cx 79  -Unable to tolerate rosuvastatin due to constipation.    -Continue atorvastatin 40 mg daily which she seems to be tolerating well..   -LDL goal <70, triglyceride goal <150  -On ASA 81mg   -Continue metoprolol at 25 Mg daily  -Encouraged continued diet and exercise  -Lexiscan stress test from 04/12/2023 negative for ischemia  -Advised regarding risk of myocardial infarction, need for vigilance for any anginal symptoms.  Currently active and without any cardiovascular symptoms.    Essential HTN  -Well-controlled.  Continue present regimen.    Episodic palpitations  -Per his description, likely correspond to isolated ectopic beats.  Review of his smart watch tracings confirms that he has isolated PVCs sometimes in a trigeminal pattern.  -Currently his palpitations occur once a month  Plan:  -Advised patient to monitor symptoms.  He does not desire any change in therapy.  -Continue low-dose beta-blocker.  Advised that we could increase beta-blockers if symptoms become more frequent or bothersome and he is aware to let us know.  -Echo ordered today  -We discussed a sleep study.  However for now, he will monitor his oxygen saturations at night with his smart watch.    Hashimoto's thyroiditis  -On levothyroxine  -Management per primary    Dyslipidemia  -Unable to tolerate rosuvastatin due to constipation  -Continue atorvastatin 40 mg daily  -Reviewed Lipid panel from 11/06/2023 showed LDL at goal  -LDL goal less than 70 due to severe coronary calcification.    Personally discussed with patient in detail and all questions answered in layman's terms to the  Applied patient's understanding.     Dr. Zoey Overton MD, FACC      Scribe Attestation: Entered by Alis Carrion, acting as scribe for Dr. Zoey Overton MD, FACC    Provider Attestation: The documentation recorded by the scribe accurately reflects the service I personally performed and the decisions made by me,Dr. Zoey Overton MD, FACC. I have personally reviewed and made corrections to the text.

## 2024-11-18 NOTE — H&P ADULT - PROBLEM SELECTOR PLAN 3
Permissive HTN for 24 hours from symptoms onset pending MRI brain to rule out CVA  Will treat if systolic > 220 or diastolic > 120  Monitor BP

## 2024-11-18 NOTE — H&P ADULT - NSHPPHYSICALEXAM_GEN_ALL_CORE
CONSTITUTIONAL: alert and cooperative, no acute distress.   EYES: PERRL,  no scleral icterus  ENT: Mucosa moist, tongue normal.  NECK: Neck supple, trachea midline, non-tender  CARDIAC: Normal S1 and S2. Regular rate and rhythms. No Pedal edema. Peripheral pulses intact  LUNGS: Equal air entry both lungs. No rales, rhonchi, wheezing. Normal respiratory effort.   ABDOMEN: Soft, nondistended, nontender. No guarding or rebound tenderness. No hepatomegaly or splenomegaly. Bowel sound normal.   MUSCULOSKELETAL: Normocephalic, atraumatic No significant deformity or joint abnormality  NEUROLOGICAL: No gross motor or sensory deficits. CN II-XII grossly intact  SKIN: no lesions or eruptions. Normal turgor  PSYCHIATRIC: A&O x 3, appropriate mood and affect

## 2024-11-18 NOTE — ED ADULT NURSE NOTE - CHIEF COMPLAINT QUOTE
Patient BIBA presents to ED with AMS status started this morning, denies any recent falls ambulatory at scene. No sudden loss of balance, blurred vision or weakness on one side. Patient able to follow some commands but unable to recall surname or . Patient accompanied by family, states alerted mental status started this morning. BGL at the triage 96.  hx HTN, DM

## 2024-11-18 NOTE — ED PROVIDER NOTE - PHYSICAL EXAMINATION
Gen: aox2, NAD   Head: NCAT  ENT: Airway patent, moist mucous membranes, nasal passageways clear   Cardiac: Normal rate, normal rhythm   Respiratory: Lungs CTA B/L  Gastrointestinal: Abdomen soft, nontender, nondistended, no rebound, no guarding  MSK: No gross abnormalities, FROM of all four extremities, no edema  HEME: Extremities warm, pulses intact and symmetrical in all four extremities  Skin: No rashes, no lesions  Neuro: No gross neurologic deficits, CN II-XII intact, no facial asymmetry, no dysarthria, no dysmetria, no drift, strength equal in all four extremities, no sensory deficits, unclear if expressive aphasia - pt states I don't know to some questions but then states things correctly when she is unaware she is saying them, such as asking her daughter and husbands name,

## 2024-11-18 NOTE — ED ADULT NURSE NOTE - NSFALLRISKINTERV_ED_ALL_ED
Assistance OOB with selected safe patient handling equipment if applicable/Assistance with ambulation/Communicate fall risk and risk factors to all staff, patient, and family/Monitor gait and stability/Provide visual cue: yellow wristband, yellow gown, etc/Reinforce activity limits and safety measures with patient and family/Call bell, personal items and telephone in reach/Instruct patient to call for assistance before getting out of bed/chair/stretcher/Non-slip footwear applied when patient is off stretcher/[place] to call system/Physically safe environment - no spills, clutter or unnecessary equipment/Purposeful Proactive Rounding/Room/bathroom lighting operational, light cord in reach

## 2024-11-18 NOTE — H&P ADULT - ASSESSMENT
69 years old female with h/o HTN, HLD, DM, hyperthyroid, glaucoma present to ED with concern for AMS. Patient had episode of confusion today AM. Patient had episode of abnormal speech today AM as well. No weakness or numbness  Hypertensive, afebrile, sat well at RA. EKG with NSR. No leukocytosis, plt 464, Cr 1.13, hsTnT 13.6. CT head with no acute intracranial pathology. CTA with no large vessel occlusion. Noted markedly enlarged multinodular goiter

## 2024-11-18 NOTE — ED PROVIDER NOTE - NSICDXPASTMEDICALHX_GEN_ALL_CORE_FT
PAST MEDICAL HISTORY:  Hyperlipidemia     Hypertension     Hyperthyroidism     Other iron deficiency anemia     Personal history of diabetes mellitus     PUD (peptic ulcer disease)     Tobacco user

## 2024-11-18 NOTE — CONSULT NOTE ADULT - SUBJECTIVE AND OBJECTIVE BOX
HPI: 69 year old woman with hx of HTN, HLD, DM2, hyperthyroidism, and glaucoma presenting with difficulty with getting words out this morning. She woke feeling well and then all of sudden felt numbness in the right hand and difficulty answering questions. No confusion. Symptoms lasted ~1 hour. Never had symptoms like this before. She already takes aspirin and Crestor. CT head and CTA head/neck were unremarkable for acute process.     PMHx: HTN, HLD, DM2, Hyperthyroidism, Glaucoma  PSHx: none  PFHx: none  Social Hx: quit smoking, no etoh, no illicit drug use  Allergies: NKDA  ROS: speech disturbance  Medications: see EMR    Vitals: Temp 97.5F      RR   18     HR  75     /88  General: NAD  CV: regular rate and rhythm  Resp: no respiratory distress  Neck: supple  Neuro Exam: AOx3. Follows commands. No dysarthria. No aphasia. EOM intact. No facial droop. PERRL. VFF. Tongue is midline. Palate elevate symmetrically. Shoulder shrug is intact. Finger to nose and heel to shin intact. Moving all four extremities. No focal weakness. Reflexes symmetric and toes down. Gait exam deferred. Sensory intact to touch.     CT head, CTA head/neck and labs reviewed  HPI: 69 year old woman with hx of HTN, HLD, DM2, hyperthyroidism, and glaucoma presenting with difficulty with getting words out this morning. She woke feeling well and then all of sudden felt numbness in the right hand and difficulty answering questions. No confusion. Symptoms lasted ~1 hour. Never had symptoms like this before. She already takes aspirin and Crestor. CT head and CTA head/neck were unremarkable for acute process.     PMHx: HTN, HLD, DM2, Hyperthyroidism, Glaucoma  PSHx: none  PFHx: none  Social Hx: quit smoking, no etoh, no illicit drug use  Allergies: NKDA  ROS: speech disturbance  Medications: see EMR    Vitals: Temp 97.5F      RR   18     HR  75     /88  General: NAD  CV: regular rate and rhythm  Resp: no respiratory distress  Neck: supple  Neuro Exam: AOx3. Follows commands. No dysarthria. No aphasia. EOM intact. No facial droop. PERRL. VFF. Tongue is midline. Palate elevate symmetrically. Shoulder shrug is intact. Finger to nose and heel to shin intact. Moving all four extremities. No focal weakness. Reflexes symmetric and toes down. Gait exam deferred. Sensory intact to touch.     MRI brain, CT head, CTA head/neck and labs reviewed

## 2024-11-19 ENCOUNTER — TRANSCRIPTION ENCOUNTER (OUTPATIENT)
Age: 69
End: 2024-11-19

## 2024-11-19 VITALS — OXYGEN SATURATION: 98 % | DIASTOLIC BLOOD PRESSURE: 74 MMHG | HEART RATE: 81 BPM | SYSTOLIC BLOOD PRESSURE: 130 MMHG

## 2024-11-19 DIAGNOSIS — G45.9 TRANSIENT CEREBRAL ISCHEMIC ATTACK, UNSPECIFIED: ICD-10-CM

## 2024-11-19 LAB
A1C WITH ESTIMATED AVERAGE GLUCOSE RESULT: 7.5 % — HIGH (ref 4–5.6)
ALBUMIN SERPL ELPH-MCNC: 3.3 G/DL — SIGNIFICANT CHANGE UP (ref 3.3–5)
ALP SERPL-CCNC: 80 U/L — SIGNIFICANT CHANGE UP (ref 40–120)
ALT FLD-CCNC: 12 U/L — SIGNIFICANT CHANGE UP (ref 12–78)
ANION GAP SERPL CALC-SCNC: 7 MMOL/L — SIGNIFICANT CHANGE UP (ref 5–17)
AST SERPL-CCNC: 8 U/L — LOW (ref 15–37)
BILIRUB SERPL-MCNC: 0.4 MG/DL — SIGNIFICANT CHANGE UP (ref 0.2–1.2)
BUN SERPL-MCNC: 22 MG/DL — SIGNIFICANT CHANGE UP (ref 7–23)
CALCIUM SERPL-MCNC: 9.1 MG/DL — SIGNIFICANT CHANGE UP (ref 8.5–10.1)
CHLORIDE SERPL-SCNC: 109 MMOL/L — HIGH (ref 96–108)
CHOLEST SERPL-MCNC: 91 MG/DL — SIGNIFICANT CHANGE UP
CO2 SERPL-SCNC: 26 MMOL/L — SIGNIFICANT CHANGE UP (ref 22–31)
CREAT SERPL-MCNC: 1.02 MG/DL — SIGNIFICANT CHANGE UP (ref 0.5–1.3)
EGFR: 59 ML/MIN/1.73M2 — LOW
ESTIMATED AVERAGE GLUCOSE: 169 MG/DL — HIGH (ref 68–114)
FOLATE SERPL-MCNC: 10 NG/ML — SIGNIFICANT CHANGE UP
GLUCOSE BLDC GLUCOMTR-MCNC: 122 MG/DL — HIGH (ref 70–99)
GLUCOSE BLDC GLUCOMTR-MCNC: 189 MG/DL — HIGH (ref 70–99)
GLUCOSE BLDC GLUCOMTR-MCNC: 216 MG/DL — HIGH (ref 70–99)
GLUCOSE SERPL-MCNC: 114 MG/DL — HIGH (ref 70–99)
HCT VFR BLD CALC: 34.4 % — LOW (ref 34.5–45)
HDLC SERPL-MCNC: 41 MG/DL — LOW
HGB BLD-MCNC: 11 G/DL — LOW (ref 11.5–15.5)
LIPID PNL WITH DIRECT LDL SERPL: 37 MG/DL — SIGNIFICANT CHANGE UP
MAGNESIUM SERPL-MCNC: 1.4 MG/DL — LOW (ref 1.6–2.6)
MCHC RBC-ENTMCNC: 22.4 PG — LOW (ref 27–34)
MCHC RBC-ENTMCNC: 32 G/DL — SIGNIFICANT CHANGE UP (ref 32–36)
MCV RBC AUTO: 70.1 FL — LOW (ref 80–100)
NON HDL CHOLESTEROL: 50 MG/DL — SIGNIFICANT CHANGE UP
NRBC # BLD: 0 /100 WBCS — SIGNIFICANT CHANGE UP (ref 0–0)
PHOSPHATE SERPL-MCNC: 4.2 MG/DL — SIGNIFICANT CHANGE UP (ref 2.5–4.5)
PLATELET # BLD AUTO: 449 K/UL — HIGH (ref 150–400)
POTASSIUM SERPL-MCNC: 3.6 MMOL/L — SIGNIFICANT CHANGE UP (ref 3.5–5.3)
POTASSIUM SERPL-SCNC: 3.6 MMOL/L — SIGNIFICANT CHANGE UP (ref 3.5–5.3)
PROT SERPL-MCNC: 7.2 GM/DL — SIGNIFICANT CHANGE UP (ref 6–8.3)
RBC # BLD: 4.91 M/UL — SIGNIFICANT CHANGE UP (ref 3.8–5.2)
RBC # FLD: 17.4 % — HIGH (ref 10.3–14.5)
SODIUM SERPL-SCNC: 142 MMOL/L — SIGNIFICANT CHANGE UP (ref 135–145)
T4 FREE SERPL-MCNC: 1.1 NG/DL — SIGNIFICANT CHANGE UP (ref 0.9–1.8)
TRIGL SERPL-MCNC: 53 MG/DL — SIGNIFICANT CHANGE UP
TSH SERPL-MCNC: 2.6 UU/ML — SIGNIFICANT CHANGE UP (ref 0.36–3.74)
VIT B12 SERPL-MCNC: 1177 PG/ML — SIGNIFICANT CHANGE UP (ref 232–1245)
WBC # BLD: 6.11 K/UL — SIGNIFICANT CHANGE UP (ref 3.8–10.5)
WBC # FLD AUTO: 6.11 K/UL — SIGNIFICANT CHANGE UP (ref 3.8–10.5)

## 2024-11-19 PROCEDURE — 99239 HOSP IP/OBS DSCHRG MGMT >30: CPT

## 2024-11-19 RX ORDER — ASPIRIN/MAG CARB/ALUMINUM AMIN 325 MG
1 TABLET ORAL
Qty: 0 | Refills: 0 | DISCHARGE
Start: 2024-11-19

## 2024-11-19 RX ORDER — INFLUENZ VIR VAC TV P-SURF2003 15MCG/.5ML
0.5 SYRINGE (ML) INTRAMUSCULAR ONCE
Refills: 0 | Status: DISCONTINUED | OUTPATIENT
Start: 2024-11-19 | End: 2024-11-19

## 2024-11-19 RX ORDER — CLOPIDOGREL 75 MG/1
1 TABLET ORAL
Qty: 20 | Refills: 0
Start: 2024-11-19 | End: 2024-12-08

## 2024-11-19 RX ORDER — LOSARTAN POTASSIUM 25 MG/1
1 TABLET ORAL
Refills: 0 | DISCHARGE

## 2024-11-19 RX ORDER — MAGNESIUM SULFATE IN 0.9% NACL 2 G/50 ML
1 INTRAVENOUS SOLUTION, PIGGYBACK (ML) INTRAVENOUS ONCE
Refills: 0 | Status: COMPLETED | OUTPATIENT
Start: 2024-11-19 | End: 2024-11-19

## 2024-11-19 RX ORDER — ROSUVASTATIN CALCIUM 10 MG
1 TABLET ORAL
Qty: 30 | Refills: 0
Start: 2024-11-19 | End: 2024-12-18

## 2024-11-19 RX ORDER — INSULIN GLARGINE,HUM.REC.ANLOG 100/ML
10 VIAL (ML) SUBCUTANEOUS
Qty: 0 | Refills: 0 | DISCHARGE
Start: 2024-11-19

## 2024-11-19 RX ORDER — ROSUVASTATIN CALCIUM 10 MG
20 TABLET ORAL AT BEDTIME
Refills: 0 | Status: DISCONTINUED | OUTPATIENT
Start: 2024-11-19 | End: 2024-11-19

## 2024-11-19 RX ADMIN — Medication 1: at 17:21

## 2024-11-19 RX ADMIN — Medication 100 GRAM(S): at 12:03

## 2024-11-19 RX ADMIN — CLOPIDOGREL 75 MILLIGRAM(S): 75 TABLET ORAL at 11:59

## 2024-11-19 RX ADMIN — DORZOLAMIDE HYDROCHLORIDE 1 DROP(S): 20 SOLUTION OPHTHALMIC at 08:47

## 2024-11-19 RX ADMIN — Medication 81 MILLIGRAM(S): at 11:59

## 2024-11-19 RX ADMIN — Medication 2: at 12:00

## 2024-11-19 NOTE — DISCHARGE NOTE PROVIDER - NSDCCPCAREPLAN_GEN_ALL_CORE_FT
Letter sent.  Jennyfer FLYNN RN BSN     PRINCIPAL DISCHARGE DIAGNOSIS  Diagnosis: Brain TIA  Assessment and Plan of Treatment: You came to the hospital due to an episode of altered mental status. Your MRI was negative for a stroke. The neurologist saw you during the hospital stay and diagnosed you with a TIA. This is when you have a transient episode of confusion due to temporary decreased blood flow to the brain. Please take aspirin, plavix, and crestor as prescribed. Please follow up with a neurologist in clinic.      SECONDARY DISCHARGE DIAGNOSES  Diagnosis: Hypertension  Assessment and Plan of Treatment: Your blood pressure medications were stopped during the hospital stay because your blood pressure was normal. It is okay to resume taking atenolol. Please stop taking your other blood pressure medications, as these can cause low blood pressure, and follow up with your PCP in clinic for further management.    Diagnosis: Multinodular goiter  Assessment and Plan of Treatment: You had a CT scan of your neck that showed you have a multinodular thyroid gland measuring 5.5x10.4x9.4cm. It is pushing your wind pipe (trachea). The ENT surgeon reviewed your images, and given you do not have any symptoms it is okay to follow up in clinic. Please follow up with ENT and your endocrinologist in clinic.     PRINCIPAL DISCHARGE DIAGNOSIS  Diagnosis: Brain TIA  Assessment and Plan of Treatment: You came to the hospital due to an episode of altered mental status. Your MRI was negative for a stroke. The neurologist saw you during the hospital stay and diagnosed you with a TIA. This is when you have a transient episode of confusion due to temporary decreased blood flow to the brain. Please take aspirin, plavix, and crestor as prescribed. Please follow up with a neurologist in clinic.      SECONDARY DISCHARGE DIAGNOSES  Diagnosis: Hypertension  Assessment and Plan of Treatment: Your blood pressure medications were stopped during the hospital stay because your blood pressure was normal. It is okay to resume taking atenolol. Please stop taking your other blood pressure medications, as these can cause low blood pressure, and follow up with your PCP in clinic for further management.    Diagnosis: Multinodular goiter  Assessment and Plan of Treatment: You had a CT scan of your neck that showed you have a multinodular thyroid gland measuring 5.5x10.4x9.4cm. It is pushing your wind pipe (trachea). The ENT surgeon reviewed your images, and given you do not have any symptoms it is okay to follow up in clinic. Please follow up with ENT and your endocrinologist in clinic.    Diagnosis: Type 2 diabetes mellitus  Assessment and Plan of Treatment: Your insulin was decreased to 10 units given your episode of low blood sugar prior to the hospitalization. Please continue taking 10 units as prescribed and follow up with your endocrinologist in clinic.     R/O Lung metastasis Perianal fistula

## 2024-11-19 NOTE — DISCHARGE NOTE NURSING/CASE MANAGEMENT/SOCIAL WORK - PATIENT PORTAL LINK FT
You can access the FollowMyHealth Patient Portal offered by [facility] by registering at the following website: [email or web address]. By joining [employer]’s FollowMyHealth portal, you will also be able to view your health information using other applications (apps) compatible with our system.

## 2024-11-19 NOTE — PROGRESS NOTE ADULT - PROBLEM SELECTOR PLAN 5
#Multinodular goiter  Hyperthyroidism, on methimazole 5mg daily  CT  ( I personally review) noted multinodular goiter with mass effect. ENT consulted to review to determine if patient needs urgent evaluation. Currently on RA without respiratory distress.

## 2024-11-19 NOTE — PROGRESS NOTE ADULT - PROBLEM SELECTOR PLAN 1
#TIA  present with transient episode of confusion and speech difficulty  CT head  ( I personally review) with no acute intracranial pathology. CTA with no large vessel occlusion. Noted markedly enlarged multinodular goiter  From patient's phone monitor, patient had episode of hypoglycemia in early 3AM or so  metabolic encephalopathy due to hypoglycemia vs TIA  permissive HTN  24 hours from symptoms onset. neuro check  MRI brain negative for acute findings  -neurology consulted-->recommending DAPT x 21 day for TIA and then cont to ASA/statin  Lipid panel, A1c  continue aspirin, statin

## 2024-11-19 NOTE — DISCHARGE NOTE PROVIDER - CARE PROVIDER_API CALL
Your PCP,   Please see your primary care provider within 1 week for a post hospital follow up appointment  Phone: (   )    -  Fax: (   )    -  Follow Up Time:

## 2024-11-19 NOTE — PROGRESS NOTE ADULT - TIME BILLING
Lab test review, Radiology Review, Vitals review, Consultant review and discussion, Physical examination, IDR, Assessment and plan; Plan discussion with patient and family     Spoke with daughter at bedside

## 2024-11-19 NOTE — DISCHARGE NOTE PROVIDER - HOSPITAL COURSE
HPI:  69 years old female with h/o HTN, HLD, DM, hyperthyroid, glaucoma present to ED with concern for AMS. Patient had episode of confusion today AM. Patient had episode of abnormal speech today AM as well. No weakness or numbness  Hypertensive, afebrile, sat well at RA. EKG with NSR. No leukocytosis, plt 464, Cr 1.13, hsTnT 13.6. CT head with no acute intracranial pathology. CTA with no large vessel occlusion. Noted markedly enlarged multinodular goiter    MRI negative for stroke. Neurology consulted, recommended 21 days of DAPT for TIA and then to continue with ASA/statin. ENT consulted for multinodular goiter, give patient is asymptomatic okay for discharge with outpatient follow. PT evaluated the patient, no PT needs.      Problem/Plan - 1:  ·  Problem: AMS (altered mental status).   ·  Plan: #TIA  present with transient episode of confusion and speech difficulty  CT head  ( I personally review) with no acute intracranial pathology. CTA with no large vessel occlusion. Noted markedly enlarged multinodular goiter  From patient's phone monitor, patient had episode of hypoglycemia in early 3AM or so  metabolic encephalopathy due to hypoglycemia vs TIA  permissive HTN  24 hours from symptoms onset. neuro check  MRI brain negative for acute findings  -neurology consulted-->recommending DAPT x 21 day for TIA and then cont to ASA/statin  Lipid panel, A1c  continue aspirin, statin.     Problem/Plan - 2:  ·  Problem: Type 2 diabetes mellitus with hypoglycemia.   ·  Plan: patient has freestyle libria. From phone monitor, hypoglycemic in early 3AM or so  Currently on Lantus 18 unit hs, metformin 1g bid, Jardiance 25mg daily  and Mounjaro  Decrease Lantus to 10 unit hs  ISS, hypoglycemia protocol, A1c  Monitor glucose and titrate insulin regimens  Continue outpatient Endo follow up, patient has upcoming follow up appointment.     Problem/Plan - 3:  ·  Problem: Benign essential HTN.   ·  Plan: Permissive HTN for 24 hours from symptoms onset pending MRI brain to rule out CVA  Will treat if systolic > 220 or diastolic > 120  Monitor BP.  -normotensive while not on BP meds, okay to resume atenolol at discharge but will advise patient hold other BP meds and follow up with PCP     Problem/Plan - 4:  ·  Problem: Hyperlipidemia, unspecified.   ·  Plan: on rosuvastatin 5mg hs  lipid panel.     Problem/Plan - 5:  ·  Problem: Hyperthyroidism.   ·  Plan: #Multinodular goiter  Hyperthyroidism, on methimazole 5mg daily  CT  ( I personally review) noted multinodular goiter with mass effect. ENT consulted to review to determine if patient needs urgent evaluation. Currently on RA without respiratory distress.  -I spoke with ENT attending at [facility] given patient is asymptomatic okay for outpatient follow up

## 2024-11-19 NOTE — DISCHARGE NOTE PROVIDER - NSDCFUADDAPPT_GEN_ALL_CORE_FT
APPTS ARE READY TO BE MADE: [X] YES    Best Family or Patient Contact (if needed):    Additional Information about above appointments (if needed):    1: PCP  2: ENT  3: neurology     Other comments or requests:

## 2024-11-19 NOTE — DISCHARGE NOTE PROVIDER - PROVIDER TOKENS
FREE:[LAST:[Your PCP],PHONE:[(   )    -],FAX:[(   )    -],ADDRESS:[Please see your primary care provider within 1 week for a post hospital follow up appointment]]

## 2024-11-19 NOTE — DISCHARGE NOTE PROVIDER - ATTENDING DISCHARGE PHYSICAL EXAMINATION:
Vital Signs Last 24 Hrs  T(C): 36.6 (19 Nov 2024 11:06), Max: 36.9 (19 Nov 2024 06:52)  T(F): 97.8 (19 Nov 2024 11:06), Max: 98.4 (19 Nov 2024 06:52)  HR: 81 (19 Nov 2024 15:36) (74 - 82)  BP: 130/74 (19 Nov 2024 15:36) (130/74 - 173/85)  BP(mean): --  RR: 18 (19 Nov 2024 11:06) (14 - 20)  SpO2: 98% (19 Nov 2024 15:36) (94% - 100%)    Parameters below as of 19 Nov 2024 15:36  Patient On (Oxygen Delivery Method): room air    GENERAL: NAD, well-groomed, well-developed  HEAD:  Atraumatic, Normocephalic  EYES: EOMI, sclera non-icteric  ENMT:  Moist mucous membranes, Good dentition,  NECK: Supple, No JVD, Normal thyroid  NERVOUS SYSTEM:  Alert & Oriented X3, Good concentration; Motor Strength 5/5 B/L upper and lower extremities  CHEST/LUNG: Clear to percussion bilaterally; No rales, rhonchi, wheezing, or rubs  HEART: Regular rate and rhythm; No murmurs, rubs, or gallops  ABDOMEN: Soft, Nontender, Nondistended; Bowel sounds present  EXTREMITIES:  2+ Peripheral Pulses, No clubbing, cyanosis, or edema  LYMPH: No lymphadenopathy   SKIN: No rashes or lesions

## 2024-11-19 NOTE — PHYSICAL THERAPY INITIAL EVALUATION ADULT - ADDITIONAL COMMENTS
Pt states she lives in a PH with no TASHI, 6 steps down to main level where she resides with HRs. Pt states she lives with daughter, and was independent with functional mobility no AD and ADL's PTA.

## 2024-11-19 NOTE — DISCHARGE NOTE NURSING/CASE MANAGEMENT/SOCIAL WORK - FINANCIAL ASSISTANCE
[facility] provides services at a reduced cost to those who are determined to be eligible through [facility]’s financial assistance program. Information regarding [facility]’s financial assistance program can be found by going to [email or web address] or by calling [phone].

## 2024-11-19 NOTE — DISCHARGE NOTE PROVIDER - NSDCFUADDINST_GEN_ALL_CORE_FT
Please  your medication from the pharmacy. If you develop shortness of breath or trouble breathing please go to the emergency room.

## 2024-11-19 NOTE — PATIENT PROFILE ADULT - FALL HARM RISK - UNIVERSAL INTERVENTIONS
Bed in lowest position, wheels locked, appropriate side rails in place/Call bell, personal items and telephone in reach/Instruct patient to call for assistance before getting out of bed or chair/Non-slip footwear when patient is out of bed/[place] to call system/Physically safe environment - no spills, clutter or unnecessary equipment/Purposeful Proactive Rounding/Room/bathroom lighting operational, light cord in reach

## 2024-11-19 NOTE — PROGRESS NOTE ADULT - SUBJECTIVE AND OBJECTIVE BOX
Patient is a 69y old  Female who presents with a chief complaint of AMS (18 Nov 2024 15:11)      INTERVAL HPI/OVERNIGHT EVENTS:  -nothing acute overnight   -patient seen and examined at bedside, daughter at bedside   -PT to eval patient     MEDICATIONS  (STANDING):  aspirin  chewable 81 milliGRAM(s) Oral daily  clopidogrel Tablet 75 milliGRAM(s) Oral daily  dextrose 5%. 1000 milliLiter(s) (50 mL/Hr) IV Continuous <Continuous>  dextrose 5%. 1000 milliLiter(s) (100 mL/Hr) IV Continuous <Continuous>  dextrose 50% Injectable 25 Gram(s) IV Push once  dextrose 50% Injectable 12.5 Gram(s) IV Push once  dextrose 50% Injectable 25 Gram(s) IV Push once  dorzolamide 2% Ophthalmic Solution 1 Drop(s) Both EYES <User Schedule>  enoxaparin Injectable 40 milliGRAM(s) SubCutaneous every 24 hours  glucagon  Injectable 1 milliGRAM(s) IntraMuscular once  influenza  Vaccine (HIGH DOSE) 0.5 milliLiter(s) IntraMuscular once  insulin glargine Injectable (LANTUS) 10 Unit(s) SubCutaneous at bedtime  insulin lispro (ADMELOG) corrective regimen sliding scale   SubCutaneous three times a day before meals  insulin lispro (ADMELOG) corrective regimen sliding scale   SubCutaneous at bedtime  latanoprost 0.005% Ophthalmic Solution 1 Drop(s) Both EYES at bedtime  methimazole 5 milliGRAM(s) Oral daily  rosuvastatin 20 milliGRAM(s) Oral at bedtime    MEDICATIONS  (PRN):  acetaminophen     Tablet .. 650 milliGRAM(s) Oral every 6 hours PRN Mild Pain (1 - 3), Moderate Pain (4 - 6)  dextrose Oral Gel 15 Gram(s) Oral once PRN Blood Glucose LESS THAN 70 milliGRAM(s)/deciliter  hydrALAZINE Injectable 5 milliGRAM(s) IV Push every 6 hours PRN systolic BP > 220 or diastolic BP > 120  melatonin 3 milliGRAM(s) Oral at bedtime PRN Insomnia      Allergies    No Known Allergies    Intolerances        REVIEW OF SYSTEMS:  no CP SOB nausea vomiting diarrhea     Vital Signs Last 24 Hrs  T(C): 36.6 (19 Nov 2024 11:06), Max: 37.4 (18 Nov 2024 15:07)  T(F): 97.8 (19 Nov 2024 11:06), Max: 99.4 (18 Nov 2024 15:07)  HR: 80 (19 Nov 2024 11:06) (74 - 88)  BP: 138/79 (19 Nov 2024 11:06) (138/79 - 173/85)  BP(mean): --  RR: 18 (19 Nov 2024 11:06) (14 - 20)  SpO2: 98% (19 Nov 2024 11:06) (94% - 100%)    Parameters below as of 19 Nov 2024 11:06  Patient On (Oxygen Delivery Method): room air      PHYSICAL EXAM:  GENERAL: NAD, well-groomed, well-developed  HEAD:  Atraumatic, Normocephalic  EYES: EOMI, sclera non-icteric  ENMT:  Moist mucous membranes, Good dentition,  NECK: Supple, No JVD, Normal thyroid  NERVOUS SYSTEM:  Alert & Oriented X3, Good concentration; Motor Strength 5/5 B/L upper and lower extremities  CHEST/LUNG: Clear to percussion bilaterally; No rales, rhonchi, wheezing, or rubs  HEART: Regular rate and rhythm; No murmurs, rubs, or gallops  ABDOMEN: Soft, Nontender, Nondistended; Bowel sounds present  EXTREMITIES:  2+ Peripheral Pulses, No clubbing, cyanosis, or edema  LYMPH: No lymphadenopathy   SKIN: No rashes or lesions      LABS:                        11.0   6.11  )-----------( 449      ( 19 Nov 2024 07:30 )             34.4     11-19    142  |  109[H]  |  22  ----------------------------<  114[H]  3.6   |  26  |  1.02    Ca    9.1      19 Nov 2024 07:30  Phos  4.2     11-19  Mg     1.4     11-19    TPro  7.2  /  Alb  3.3  /  TBili  0.4  /  DBili  x   /  AST  8[L]  /  ALT  12  /  AlkPhos  80  11-19    PT/INR - ( 18 Nov 2024 09:07 )   PT: 11.2 sec;   INR: 0.99 ratio         PTT - ( 18 Nov 2024 09:07 )  PTT:34.2 sec  Urinalysis Basic - ( 19 Nov 2024 07:30 )    Color: x / Appearance: x / SG: x / pH: x  Gluc: 114 mg/dL / Ketone: x  / Bili: x / Urobili: x   Blood: x / Protein: x / Nitrite: x   Leuk Esterase: x / RBC: x / WBC x   Sq Epi: x / Non Sq Epi: x / Bacteria: x      CAPILLARY BLOOD GLUCOSE      POCT Blood Glucose.: 216 mg/dL (19 Nov 2024 11:36)  POCT Blood Glucose.: 122 mg/dL (19 Nov 2024 08:07)  POCT Blood Glucose.: 145 mg/dL (18 Nov 2024 21:44)  POCT Blood Glucose.: 124 mg/dL (18 Nov 2024 17:56)      RADIOLOGY & ADDITIONAL TESTS:    Imaging Personally Reviewed:  [ X] YES  [ ] NO    Consultant(s) Notes Reviewed:  [ X] YES  [ ] NO    Care Discussed with Consultants/Other Providers [X ] YES  [ ] NO

## 2024-11-19 NOTE — PHYSICAL THERAPY INITIAL EVALUATION ADULT - PERTINENT HX OF CURRENT PROBLEM, REHAB EVAL
69 years old female with h/o HTN, HLD, DM, hyperthyroid, glaucoma present to ED with concern for AMS. Patient had episode of confusion today AM. Patient had episode of abnormal speech today AM as well. No weakness or numbness

## 2024-11-19 NOTE — DISCHARGE NOTE PROVIDER - NSDCMRMEDTOKEN_GEN_ALL_CORE_FT
aspirin 81 mg oral tablet, chewable: 1 tab(s) orally once a day  atenolol 25 mg oral tablet: 1 tab(s) orally once a day  brimonidine 0.1% ophthalmic solution: 1 drop(s) to R eye 2 times a day  calcium carbonate:   clopidogrel 75 mg oral tablet: 1 tab(s) orally once a day  dorzolamide 2% ophthalmic solution: 1 drop(s) in each eye 2 times a day  ferrous sulfate 325 mg (65 mg elemental iron) oral tablet: 2 tab(s) orally once a day  insulin glargine 100 units/mL subcutaneous solution: 10 unit(s) subcutaneous once a day (at bedtime)  Jardiance 25 mg oral tablet: 1 tab(s) orally once a day  latanoprost 0.005% ophthalmic solution: 1 drop(s) to R eye once a day (in the evening)  metFORMIN 1000 mg oral tablet: 1 tab(s) orally 2 times a day  HOLD METFORMIN FRO 3 DAYS, RESTART ON 3/2/19  methIMAzole 5 mg oral tablet: 1 tab(s) orally once a day  Mounjaro:   omeprazole 40 mg oral delayed release capsule: 1 cap(s) orally once a day  rosuvastatin 20 mg oral tablet: 1 tab(s) orally once a day (at bedtime)  timolol hemihydrate 0.5% ophthalmic solution: 1 drop(s) to R eye 2 times a day  Vitamin B12:

## 2024-11-19 NOTE — DISCHARGE NOTE PROVIDER - NSDCFUSCHEDAPPT_GEN_ALL_CORE_FT
Emiliano Pradhan  [facility] Physician [facility]  OBGYNGEN [address] [facility]  Scheduled Appointment: 11/26/2024    Andra Kincaid  [facility]  PULMMED [id number]  Scheduled Appointment: 12/02/2024    Jarocho Wallis  [facility]  GENSURG [address] [facility]  Scheduled Appointment: 12/10/2024

## 2024-11-19 NOTE — DISCHARGE NOTE PROVIDER - NSDCQMPATIENTREHAB_NEU_A_CORE
Spoke to daughter Diana.  She was notified of results and recommendations.  She verbalized understanding.      Assessment performed

## 2024-11-19 NOTE — PATIENT PROFILE ADULT - NSPROGENPREVTRANSF_GEN_A_NUR
Approve      Recheck labs in 3 months to include BMP, A1c, TSH --12/2019    6mos OV due 3/2020 no history of blood product transfusion

## 2024-11-20 DIAGNOSIS — E05.90 THYROTOXICOSIS, UNSPECIFIED WITHOUT THYROTOXIC CRISIS OR STORM: ICD-10-CM

## 2024-11-20 DIAGNOSIS — E78.5 HYPERLIPIDEMIA, UNSPECIFIED: ICD-10-CM

## 2024-11-20 DIAGNOSIS — Z79.82 LONG TERM (CURRENT) USE OF ASPIRIN: ICD-10-CM

## 2024-11-20 DIAGNOSIS — H40.9 UNSPECIFIED GLAUCOMA: ICD-10-CM

## 2024-11-20 DIAGNOSIS — Z79.02 LONG TERM (CURRENT) USE OF ANTITHROMBOTICS/ANTIPLATELETS: ICD-10-CM

## 2024-11-20 DIAGNOSIS — Z79.84 LONG TERM (CURRENT) USE OF ORAL HYPOGLYCEMIC DRUGS: ICD-10-CM

## 2024-11-20 DIAGNOSIS — I10 ESSENTIAL (PRIMARY) HYPERTENSION: ICD-10-CM

## 2024-11-20 DIAGNOSIS — E11.649 TYPE 2 DIABETES MELLITUS WITH HYPOGLYCEMIA WITHOUT COMA: ICD-10-CM

## 2024-11-20 DIAGNOSIS — G45.9 TRANSIENT CEREBRAL ISCHEMIC ATTACK, UNSPECIFIED: ICD-10-CM

## 2024-11-20 DIAGNOSIS — R41.82 ALTERED MENTAL STATUS, UNSPECIFIED: ICD-10-CM

## 2024-12-10 ENCOUNTER — APPOINTMENT (OUTPATIENT)
Dept: SURGERY | Facility: CLINIC | Age: 69
End: 2024-12-10
Payer: MEDICARE

## 2024-12-10 VITALS
DIASTOLIC BLOOD PRESSURE: 84 MMHG | SYSTOLIC BLOOD PRESSURE: 148 MMHG | HEIGHT: 61.5 IN | WEIGHT: 133 LBS | BODY MASS INDEX: 24.79 KG/M2 | HEART RATE: 81 BPM

## 2024-12-10 DIAGNOSIS — E04.9 NONTOXIC GOITER, UNSPECIFIED: ICD-10-CM

## 2024-12-10 PROCEDURE — 31575 DIAGNOSTIC LARYNGOSCOPY: CPT

## 2024-12-10 PROCEDURE — 99204 OFFICE O/P NEW MOD 45 MIN: CPT | Mod: 25

## 2024-12-10 PROCEDURE — 36415 COLL VENOUS BLD VENIPUNCTURE: CPT

## 2024-12-10 RX ORDER — TIRZEPATIDE 10 MG/.5ML
10 INJECTION, SOLUTION SUBCUTANEOUS
Refills: 0 | Status: ACTIVE | COMMUNITY

## 2024-12-10 RX ORDER — INSULIN GLARGINE 100 [IU]/ML
INJECTION, SOLUTION SUBCUTANEOUS
Refills: 0 | Status: ACTIVE | COMMUNITY

## 2024-12-10 RX ORDER — EMPAGLIFLOZIN 25 MG/1
25 TABLET, FILM COATED ORAL
Refills: 0 | Status: ACTIVE | COMMUNITY

## 2024-12-10 RX ORDER — LOSARTAN POTASSIUM 100 MG/1
100 TABLET, FILM COATED ORAL
Refills: 0 | Status: ACTIVE | COMMUNITY

## 2024-12-10 RX ORDER — METHIMAZOLE 5 MG/1
5 TABLET ORAL
Refills: 0 | Status: ACTIVE | COMMUNITY

## 2024-12-10 RX ORDER — ROSUVASTATIN CALCIUM 20 MG/1
20 TABLET, FILM COATED ORAL
Refills: 0 | Status: ACTIVE | COMMUNITY

## 2024-12-11 LAB
CALCIT SERPL-MCNC: <1 PG/ML
CEA SERPL-MCNC: 2.8 NG/ML
T3 SERPL-MCNC: 126 NG/DL
T4 FREE SERPL-MCNC: 1.5 NG/DL
THYROGLOB AB SERPL-ACNC: 17.9 IU/ML
THYROPEROXIDASE AB SERPL IA-ACNC: 10.6 IU/ML
TSH SERPL-ACNC: 1.27 UIU/ML

## 2024-12-16 ENCOUNTER — APPOINTMENT (OUTPATIENT)
Dept: PULMONOLOGY | Facility: CLINIC | Age: 69
End: 2024-12-16

## 2024-12-20 ENCOUNTER — APPOINTMENT (OUTPATIENT)
Dept: OBGYN | Facility: CLINIC | Age: 69
End: 2024-12-20
Payer: MEDICARE

## 2024-12-20 VITALS
DIASTOLIC BLOOD PRESSURE: 77 MMHG | BODY MASS INDEX: 23.48 KG/M2 | SYSTOLIC BLOOD PRESSURE: 118 MMHG | HEART RATE: 86 BPM | WEIGHT: 126 LBS | HEIGHT: 61.5 IN

## 2024-12-20 DIAGNOSIS — N95.1 MENOPAUSAL AND FEMALE CLIMACTERIC STATES: ICD-10-CM

## 2024-12-20 DIAGNOSIS — N95.9 UNSPECIFIED MENOPAUSAL AND PERIMENOPAUSAL DISORDER: ICD-10-CM

## 2024-12-20 PROCEDURE — 99459 PELVIC EXAMINATION: CPT

## 2024-12-20 PROCEDURE — 99214 OFFICE O/P EST MOD 30 MIN: CPT

## 2024-12-20 RX ORDER — MELATONIN 3 MG
TABLET ORAL
Refills: 0 | Status: ACTIVE | COMMUNITY

## 2024-12-30 ENCOUNTER — APPOINTMENT (OUTPATIENT)
Dept: PULMONOLOGY | Facility: CLINIC | Age: 69
End: 2024-12-30
Payer: MEDICARE

## 2024-12-30 VITALS
WEIGHT: 125.7 LBS | HEART RATE: 87 BPM | SYSTOLIC BLOOD PRESSURE: 119 MMHG | OXYGEN SATURATION: 97 % | DIASTOLIC BLOOD PRESSURE: 72 MMHG | BODY MASS INDEX: 20.94 KG/M2 | RESPIRATION RATE: 16 BRPM | HEIGHT: 65.1 IN | TEMPERATURE: 98.1 F

## 2024-12-30 DIAGNOSIS — R91.8 OTHER NONSPECIFIC ABNORMAL FINDING OF LUNG FIELD: ICD-10-CM

## 2024-12-30 PROCEDURE — 99213 OFFICE O/P EST LOW 20 MIN: CPT

## 2024-12-31 PROBLEM — R91.8 LUNG NODULES: Status: ACTIVE | Noted: 2024-12-31

## 2025-01-02 ENCOUNTER — APPOINTMENT (OUTPATIENT)
Dept: CT IMAGING | Facility: IMAGING CENTER | Age: 70
End: 2025-01-02

## 2025-01-02 ENCOUNTER — OUTPATIENT (OUTPATIENT)
Dept: OUTPATIENT SERVICES | Facility: HOSPITAL | Age: 70
LOS: 1 days | End: 2025-01-02

## 2025-01-02 DIAGNOSIS — Z98.890 OTHER SPECIFIED POSTPROCEDURAL STATES: Chronic | ICD-10-CM

## 2025-01-02 DIAGNOSIS — E04.9 NONTOXIC GOITER, UNSPECIFIED: ICD-10-CM

## 2025-01-02 DIAGNOSIS — Z98.51 TUBAL LIGATION STATUS: Chronic | ICD-10-CM

## 2025-01-06 ENCOUNTER — APPOINTMENT (OUTPATIENT)
Dept: OTOLARYNGOLOGY | Facility: CLINIC | Age: 70
End: 2025-01-06
Payer: MEDICARE

## 2025-01-06 VITALS
BODY MASS INDEX: 23.3 KG/M2 | DIASTOLIC BLOOD PRESSURE: 62 MMHG | HEIGHT: 61.5 IN | WEIGHT: 125 LBS | HEART RATE: 82 BPM | SYSTOLIC BLOOD PRESSURE: 97 MMHG

## 2025-01-06 DIAGNOSIS — E04.9 NONTOXIC GOITER, UNSPECIFIED: ICD-10-CM

## 2025-01-06 DIAGNOSIS — J38.3 OTHER DISEASES OF VOCAL CORDS: ICD-10-CM

## 2025-01-06 PROCEDURE — 99203 OFFICE O/P NEW LOW 30 MIN: CPT | Mod: 25

## 2025-01-06 PROCEDURE — 31579 LARYNGOSCOPY TELESCOPIC: CPT

## 2025-01-16 ENCOUNTER — OUTPATIENT (OUTPATIENT)
Dept: OUTPATIENT SERVICES | Facility: HOSPITAL | Age: 70
LOS: 1 days | End: 2025-01-16
Payer: MEDICARE

## 2025-01-16 ENCOUNTER — APPOINTMENT (OUTPATIENT)
Dept: CT IMAGING | Facility: IMAGING CENTER | Age: 70
End: 2025-01-16
Payer: MEDICARE

## 2025-01-16 DIAGNOSIS — E04.9 NONTOXIC GOITER, UNSPECIFIED: ICD-10-CM

## 2025-01-16 DIAGNOSIS — Z98.51 TUBAL LIGATION STATUS: Chronic | ICD-10-CM

## 2025-01-16 DIAGNOSIS — Z00.8 ENCOUNTER FOR OTHER GENERAL EXAMINATION: ICD-10-CM

## 2025-01-16 DIAGNOSIS — Z98.890 OTHER SPECIFIED POSTPROCEDURAL STATES: Chronic | ICD-10-CM

## 2025-01-16 PROCEDURE — 70491 CT SOFT TISSUE NECK W/DYE: CPT | Mod: 26

## 2025-01-16 PROCEDURE — 70491 CT SOFT TISSUE NECK W/DYE: CPT

## 2025-01-21 ENCOUNTER — NON-APPOINTMENT (OUTPATIENT)
Age: 70
End: 2025-01-21

## 2025-01-21 DIAGNOSIS — E04.1 NONTOXIC SINGLE THYROID NODULE: ICD-10-CM

## 2025-02-11 ENCOUNTER — RESULT REVIEW (OUTPATIENT)
Age: 70
End: 2025-02-11

## 2025-02-11 ENCOUNTER — APPOINTMENT (OUTPATIENT)
Dept: MAMMOGRAPHY | Facility: IMAGING CENTER | Age: 70
End: 2025-02-11
Payer: MEDICARE

## 2025-02-11 ENCOUNTER — OUTPATIENT (OUTPATIENT)
Dept: OUTPATIENT SERVICES | Facility: HOSPITAL | Age: 70
LOS: 1 days | End: 2025-02-11
Payer: MEDICARE

## 2025-02-11 DIAGNOSIS — Z98.51 TUBAL LIGATION STATUS: Chronic | ICD-10-CM

## 2025-02-11 DIAGNOSIS — N95.1 MENOPAUSAL AND FEMALE CLIMACTERIC STATES: ICD-10-CM

## 2025-02-11 DIAGNOSIS — Z98.890 OTHER SPECIFIED POSTPROCEDURAL STATES: Chronic | ICD-10-CM

## 2025-02-11 PROCEDURE — 77063 BREAST TOMOSYNTHESIS BI: CPT | Mod: 26

## 2025-02-11 PROCEDURE — 77067 SCR MAMMO BI INCL CAD: CPT

## 2025-02-11 PROCEDURE — 77067 SCR MAMMO BI INCL CAD: CPT | Mod: 26

## 2025-02-11 PROCEDURE — 77063 BREAST TOMOSYNTHESIS BI: CPT

## 2025-02-21 ENCOUNTER — RESULT REVIEW (OUTPATIENT)
Age: 70
End: 2025-02-21

## 2025-02-21 ENCOUNTER — APPOINTMENT (OUTPATIENT)
Dept: ULTRASOUND IMAGING | Facility: IMAGING CENTER | Age: 70
End: 2025-02-21
Payer: MEDICARE

## 2025-02-21 ENCOUNTER — OUTPATIENT (OUTPATIENT)
Dept: OUTPATIENT SERVICES | Facility: HOSPITAL | Age: 70
LOS: 1 days | End: 2025-02-21
Payer: MEDICARE

## 2025-02-21 ENCOUNTER — APPOINTMENT (OUTPATIENT)
Dept: MAMMOGRAPHY | Facility: IMAGING CENTER | Age: 70
End: 2025-02-21
Payer: MEDICARE

## 2025-02-21 DIAGNOSIS — Z00.8 ENCOUNTER FOR OTHER GENERAL EXAMINATION: ICD-10-CM

## 2025-02-21 DIAGNOSIS — Z98.890 OTHER SPECIFIED POSTPROCEDURAL STATES: Chronic | ICD-10-CM

## 2025-02-21 DIAGNOSIS — Z98.51 TUBAL LIGATION STATUS: Chronic | ICD-10-CM

## 2025-02-21 PROCEDURE — 77065 DX MAMMO INCL CAD UNI: CPT | Mod: 26,RT

## 2025-02-21 PROCEDURE — G0279: CPT | Mod: 26

## 2025-02-21 PROCEDURE — 76642 ULTRASOUND BREAST LIMITED: CPT | Mod: 26,RT

## 2025-02-21 PROCEDURE — 76642 ULTRASOUND BREAST LIMITED: CPT

## 2025-02-21 PROCEDURE — G0279: CPT

## 2025-02-21 PROCEDURE — 77065 DX MAMMO INCL CAD UNI: CPT

## 2025-04-15 ENCOUNTER — APPOINTMENT (OUTPATIENT)
Dept: OTOLARYNGOLOGY | Facility: CLINIC | Age: 70
End: 2025-04-15
Payer: MEDICARE

## 2025-04-15 VITALS — HEART RATE: 81 BPM | SYSTOLIC BLOOD PRESSURE: 113 MMHG | DIASTOLIC BLOOD PRESSURE: 71 MMHG

## 2025-04-15 VITALS — HEIGHT: 61.5 IN | BODY MASS INDEX: 23.3 KG/M2 | WEIGHT: 125 LBS

## 2025-04-15 DIAGNOSIS — E04.1 NONTOXIC SINGLE THYROID NODULE: ICD-10-CM

## 2025-04-15 DIAGNOSIS — R49.0 DYSPHONIA: ICD-10-CM

## 2025-04-15 DIAGNOSIS — J38.3 OTHER DISEASES OF VOCAL CORDS: ICD-10-CM

## 2025-04-15 PROCEDURE — 31579 LARYNGOSCOPY TELESCOPIC: CPT

## 2025-04-15 PROCEDURE — 99213 OFFICE O/P EST LOW 20 MIN: CPT | Mod: 25

## 2025-07-10 ENCOUNTER — APPOINTMENT (OUTPATIENT)
Dept: SURGERY | Facility: CLINIC | Age: 70
End: 2025-07-10

## 2025-09-02 ENCOUNTER — APPOINTMENT (OUTPATIENT)
Dept: ULTRASOUND IMAGING | Facility: CLINIC | Age: 70
End: 2025-09-02
Payer: MEDICARE

## 2025-09-02 ENCOUNTER — OUTPATIENT (OUTPATIENT)
Dept: OUTPATIENT SERVICES | Facility: HOSPITAL | Age: 70
LOS: 1 days | End: 2025-09-02
Payer: MEDICARE

## 2025-09-02 DIAGNOSIS — E04.1 NONTOXIC SINGLE THYROID NODULE: ICD-10-CM

## 2025-09-02 DIAGNOSIS — Z98.51 TUBAL LIGATION STATUS: Chronic | ICD-10-CM

## 2025-09-02 DIAGNOSIS — Z98.890 OTHER SPECIFIED POSTPROCEDURAL STATES: Chronic | ICD-10-CM

## 2025-09-02 PROCEDURE — 76536 US EXAM OF HEAD AND NECK: CPT | Mod: 26

## 2025-09-02 PROCEDURE — 76536 US EXAM OF HEAD AND NECK: CPT
